# Patient Record
Sex: MALE | Race: WHITE | NOT HISPANIC OR LATINO | Employment: FULL TIME | URBAN - METROPOLITAN AREA
[De-identification: names, ages, dates, MRNs, and addresses within clinical notes are randomized per-mention and may not be internally consistent; named-entity substitution may affect disease eponyms.]

---

## 2017-10-13 ENCOUNTER — AMBULATORY - HEALTHEAST (OUTPATIENT)
Dept: NURSING | Facility: CLINIC | Age: 37
End: 2017-10-13

## 2018-02-27 ENCOUNTER — OFFICE VISIT - HEALTHEAST (OUTPATIENT)
Dept: FAMILY MEDICINE | Facility: CLINIC | Age: 38
End: 2018-02-27

## 2018-02-27 DIAGNOSIS — B35.4 TINEA CORPORIS: ICD-10-CM

## 2018-03-27 ENCOUNTER — OFFICE VISIT - HEALTHEAST (OUTPATIENT)
Dept: FAMILY MEDICINE | Facility: CLINIC | Age: 38
End: 2018-03-27

## 2018-03-27 DIAGNOSIS — Z13.220 LIPID SCREENING: ICD-10-CM

## 2018-03-27 DIAGNOSIS — Z00.00 ROUTINE GENERAL MEDICAL EXAMINATION AT A HEALTH CARE FACILITY: ICD-10-CM

## 2018-03-27 DIAGNOSIS — K21.9 GASTROESOPHAGEAL REFLUX DISEASE, ESOPHAGITIS PRESENCE NOT SPECIFIED: ICD-10-CM

## 2018-03-27 DIAGNOSIS — Z13.1 DIABETES MELLITUS SCREENING: ICD-10-CM

## 2018-03-27 DIAGNOSIS — E66.9 OBESITY: ICD-10-CM

## 2018-03-27 DIAGNOSIS — B35.4 TINEA CORPORIS: ICD-10-CM

## 2018-03-27 LAB
FASTING STATUS PATIENT QL REPORTED: YES
GLUCOSE BLD-MCNC: 78 MG/DL (ref 70–125)

## 2018-03-27 RX ORDER — FAMOTIDINE 20 MG/1
20 TABLET, FILM COATED ORAL 2 TIMES DAILY
Status: SHIPPED | COMMUNITY
Start: 2018-03-27

## 2018-03-27 ASSESSMENT — MIFFLIN-ST. JEOR: SCORE: 2095.59

## 2018-03-28 LAB
CHOLEST SERPL-MCNC: 179 MG/DL
FASTING STATUS PATIENT QL REPORTED: YES
HDLC SERPL-MCNC: 39 MG/DL
LDLC SERPL CALC-MCNC: 116 MG/DL
TRIGL SERPL-MCNC: 118 MG/DL

## 2018-04-02 ENCOUNTER — COMMUNICATION - HEALTHEAST (OUTPATIENT)
Dept: FAMILY MEDICINE | Facility: CLINIC | Age: 38
End: 2018-04-02

## 2018-05-22 ENCOUNTER — COMMUNICATION - HEALTHEAST (OUTPATIENT)
Dept: FAMILY MEDICINE | Facility: CLINIC | Age: 38
End: 2018-05-22

## 2018-05-22 DIAGNOSIS — R21 SKIN RASH: ICD-10-CM

## 2018-06-13 ENCOUNTER — COMMUNICATION - HEALTHEAST (OUTPATIENT)
Dept: FAMILY MEDICINE | Facility: CLINIC | Age: 38
End: 2018-06-13

## 2018-07-16 ENCOUNTER — RECORDS - HEALTHEAST (OUTPATIENT)
Dept: ADMINISTRATIVE | Facility: OTHER | Age: 38
End: 2018-07-16

## 2019-07-12 ENCOUNTER — COMMUNICATION - HEALTHEAST (OUTPATIENT)
Dept: SCHEDULING | Facility: CLINIC | Age: 39
End: 2019-07-12

## 2019-08-03 ENCOUNTER — TELEPHONE (OUTPATIENT)
Dept: OTHER | Facility: CLINIC | Age: 39
End: 2019-08-03

## 2019-08-03 NOTE — TELEPHONE ENCOUNTER
Selection of Care System:  [facility]    Selection of primary provider/clinic:  NONE / [facility]    In general how would you rate your overall health?  Very Good    In general how would you rate your overall mental or emotional health?  Very Good    Have you been to the emergency department 3 or more times in the past 6 months?  No    Do you have any health concerns that you need assistance finding a provider or scheduling an appointment? no    Medications:  NONE    [place] or [facility] assigned to patient: no     Current Pharmacy/location and number: NA     Pharmacy of choice: NA     Prescription names: NA

## 2019-08-10 ENCOUNTER — OFFICE VISIT - HEALTHEAST (OUTPATIENT)
Dept: FAMILY MEDICINE | Facility: CLINIC | Age: 39
End: 2019-08-10

## 2019-08-10 DIAGNOSIS — R42 VERTIGO: ICD-10-CM

## 2019-08-10 ASSESSMENT — MIFFLIN-ST. JEOR: SCORE: 2095.13

## 2019-11-02 ENCOUNTER — AMBULATORY - HEALTHEAST (OUTPATIENT)
Dept: NURSING | Facility: CLINIC | Age: 39
End: 2019-11-02

## 2021-03-27 ENCOUNTER — OFFICE VISIT - HEALTHEAST (OUTPATIENT)
Dept: FAMILY MEDICINE | Facility: CLINIC | Age: 41
End: 2021-03-27

## 2021-03-27 DIAGNOSIS — R05.9 COUGH: ICD-10-CM

## 2021-03-27 DIAGNOSIS — J06.9 VIRAL URI: ICD-10-CM

## 2021-03-27 RX ORDER — ALBUTEROL SULFATE 90 UG/1
2 AEROSOL, METERED RESPIRATORY (INHALATION) EVERY 4 HOURS PRN
Qty: 8.5 G | Refills: 1 | Status: SHIPPED | OUTPATIENT
Start: 2021-03-27 | End: 2021-11-20

## 2021-03-28 LAB
SARS-COV-2 PCR COMMENT: NORMAL
SARS-COV-2 RNA SPEC QL NAA+PROBE: NEGATIVE
SARS-COV-2 VIRUS SPECIMEN SOURCE: NORMAL

## 2021-03-29 ENCOUNTER — COMMUNICATION - HEALTHEAST (OUTPATIENT)
Dept: SCHEDULING | Facility: CLINIC | Age: 41
End: 2021-03-29

## 2021-04-23 ENCOUNTER — COMMUNICATION - HEALTHEAST (OUTPATIENT)
Dept: FAMILY MEDICINE | Facility: CLINIC | Age: 41
End: 2021-04-23

## 2021-04-23 ENCOUNTER — AMBULATORY - HEALTHEAST (OUTPATIENT)
Dept: FAMILY MEDICINE | Facility: CLINIC | Age: 41
End: 2021-04-23

## 2021-04-23 DIAGNOSIS — Z20.822 EXPOSURE TO COVID-19 VIRUS: ICD-10-CM

## 2021-04-24 ENCOUNTER — AMBULATORY - HEALTHEAST (OUTPATIENT)
Dept: FAMILY MEDICINE | Facility: CLINIC | Age: 41
End: 2021-04-24

## 2021-04-24 DIAGNOSIS — Z20.822 EXPOSURE TO COVID-19 VIRUS: ICD-10-CM

## 2021-04-26 ENCOUNTER — COMMUNICATION - HEALTHEAST (OUTPATIENT)
Dept: SCHEDULING | Facility: CLINIC | Age: 41
End: 2021-04-26

## 2021-05-05 ENCOUNTER — COMMUNICATION - HEALTHEAST (OUTPATIENT)
Dept: SCHEDULING | Facility: CLINIC | Age: 41
End: 2021-05-05

## 2021-05-06 ENCOUNTER — AMBULATORY - HEALTHEAST (OUTPATIENT)
Dept: NURSING | Facility: CLINIC | Age: 41
End: 2021-05-06

## 2021-05-06 ENCOUNTER — COMMUNICATION - HEALTHEAST (OUTPATIENT)
Dept: SCHEDULING | Facility: CLINIC | Age: 41
End: 2021-05-06

## 2021-05-06 ENCOUNTER — COMMUNICATION - HEALTHEAST (OUTPATIENT)
Dept: NURSING | Facility: CLINIC | Age: 41
End: 2021-05-06

## 2021-05-06 DIAGNOSIS — U07.1 2019 NOVEL CORONAVIRUS DISEASE (COVID-19): ICD-10-CM

## 2021-05-10 ENCOUNTER — OFFICE VISIT - HEALTHEAST (OUTPATIENT)
Dept: FAMILY MEDICINE | Facility: CLINIC | Age: 41
End: 2021-05-10

## 2021-05-10 ENCOUNTER — RECORDS - HEALTHEAST (OUTPATIENT)
Dept: GENERAL RADIOLOGY | Facility: CLINIC | Age: 41
End: 2021-05-10

## 2021-05-10 ENCOUNTER — COMMUNICATION - HEALTHEAST (OUTPATIENT)
Dept: SCHEDULING | Facility: CLINIC | Age: 41
End: 2021-05-10

## 2021-05-10 DIAGNOSIS — R50.9 FEVER, UNSPECIFIED: ICD-10-CM

## 2021-05-10 DIAGNOSIS — U07.1 COVID-19: ICD-10-CM

## 2021-05-10 DIAGNOSIS — U07.1 CLINICAL DIAGNOSIS OF COVID-19: ICD-10-CM

## 2021-05-10 DIAGNOSIS — R05.9 COUGH: ICD-10-CM

## 2021-05-10 DIAGNOSIS — R93.89 ABNORMAL CHEST X-RAY: ICD-10-CM

## 2021-05-10 DIAGNOSIS — R50.9 FEVER, UNSPECIFIED FEVER CAUSE: ICD-10-CM

## 2021-05-14 ENCOUNTER — COMMUNICATION - HEALTHEAST (OUTPATIENT)
Dept: SCHEDULING | Facility: CLINIC | Age: 41
End: 2021-05-14

## 2021-05-14 ENCOUNTER — OFFICE VISIT - HEALTHEAST (OUTPATIENT)
Dept: FAMILY MEDICINE | Facility: CLINIC | Age: 41
End: 2021-05-14

## 2021-05-14 ENCOUNTER — COMMUNICATION - HEALTHEAST (OUTPATIENT)
Dept: FAMILY MEDICINE | Facility: CLINIC | Age: 41
End: 2021-05-14

## 2021-05-14 DIAGNOSIS — U07.1 COVID-19 VIRUS INFECTION: ICD-10-CM

## 2021-05-14 DIAGNOSIS — H92.02 LEFT EAR PAIN: ICD-10-CM

## 2021-05-14 NOTE — ASSESSMENT & PLAN NOTE
Patient treated for suspected secondary pneumonia starting Monday of this week but overall is improving at this time. Oxygen and pulsenormal per patient report.

## 2021-05-29 ENCOUNTER — RECORDS - HEALTHEAST (OUTPATIENT)
Dept: ADMINISTRATIVE | Facility: CLINIC | Age: 41
End: 2021-05-29

## 2021-05-30 NOTE — TELEPHONE ENCOUNTER
Wife, Andreina, calling, no consent on file.  Patient gave verbal consent to speak with wife.    Wife says he has had temperature of 104.1 today.  Has been nauseous.  Has had 2-3 episodes of diarrhea.  Has body aches and stomach cramps.  Stomach cramps have resolved.  Wife is concerned about the fever.  Patient took Tylenol and temperature was down to 101.  Now temperature is 102.5.     Triaged to disposition of See Physician Within 24 Hours.  Patient intends to go to Walk-in-clinic tomorrow.  Care advice given per protocol: drink more fluids, fever medicines.    Advised to call back if signs of dehydrations occur, blood in stool occurs, abdominal pain becomes severe or constant or he becomes worse.    Lana Mcguire RN  Triage Nurse Advisor    Reason for Disposition    Fever > 101 F (38.3 C)    Protocols used: DIARRHEA-A-AH

## 2021-05-31 ENCOUNTER — RECORDS - HEALTHEAST (OUTPATIENT)
Dept: ADMINISTRATIVE | Facility: CLINIC | Age: 41
End: 2021-05-31

## 2021-05-31 VITALS — WEIGHT: 250 LBS

## 2021-05-31 NOTE — PATIENT INSTRUCTIONS - HE
Advised fluids, close follow up in the Emergency Department if no improvement in 8 hrs.    Pt awaiting ride home. NAD noted. Updated on POC.

## 2021-05-31 NOTE — PROGRESS NOTES
Chief Complaint   Patient presents with     Dizziness     STARTED SINCE YESTERDAY 8/9-- REALLY BAD THIS MORNING.      Ear Problem     FEELS OFF          HPI      Patient is here for one day of dizziness described as room spinning with head movement. No dizziness at rest. No headache, visual disturbances, nausea, vomiting, fever, chills, cough, nasal congestion.     ROS: Pertinent ROS noted in HPI.     No Known Allergies    There is no problem list on file for this patient.      Family History   Problem Relation Age of Onset     No Medical Problems Mother      Kidney cancer Father      Heart failure Maternal Grandmother      Dementia Maternal Grandfather      Cancer Paternal Grandmother        Social History     Socioeconomic History     Marital status:      Spouse name: Not on file     Number of children: Not on file     Years of education: Not on file     Highest education level: Not on file   Occupational History     Not on file   Social Needs     Financial resource strain: Not on file     Food insecurity:     Worry: Not on file     Inability: Not on file     Transportation needs:     Medical: Not on file     Non-medical: Not on file   Tobacco Use     Smoking status: Never Smoker     Smokeless tobacco: Never Used   Substance and Sexual Activity     Alcohol use: No     Drug use: No     Sexual activity: Yes     Partners: Female     Comment:    Lifestyle     Physical activity:     Days per week: Not on file     Minutes per session: Not on file     Stress: Not on file   Relationships     Social connections:     Talks on phone: Not on file     Gets together: Not on file     Attends Gnosticism service: Not on file     Active member of club or organization: Not on file     Attends meetings of clubs or organizations: Not on file     Relationship status: Not on file     Intimate partner violence:     Fear of current or ex partner: Not on file     Emotionally abused: Not on file     Physically abused: Not on  file     Forced sexual activity: Not on file   Other Topics Concern     Not on file   Social History Narrative     Not on file         Objective:    Vitals:    08/10/19 0927   BP: 126/82   Pulse: 72   Resp: 20   Temp: 98.8  F (37.1  C)   SpO2: 98%       Gen:NAD  Throat: oropharynx clear, tonsils normal  Ears: TMs clear without effusions, ear canals normal with small cerumen  Nose: No discharge  Neck: No adenopathy  CV: RRR, normal S1S2, no M, R, G  Pulm: CTAB, normal effort    [place] hallpike = positive  Epley = done with some improvement    Vertigo  -     meclizine (ANTIVERT) 25 mg tablet; Take 1 tablet (25 mg total) by mouth 3 (three) times a day as needed for dizziness or nausea.      Suspect BPPV. Advised fluids, and close follow up if symptoms escalate or fail to improve.

## 2021-06-01 VITALS — WEIGHT: 247.1 LBS | BODY MASS INDEX: 31.71 KG/M2 | HEIGHT: 74 IN

## 2021-06-03 VITALS — WEIGHT: 247 LBS | HEIGHT: 74 IN | BODY MASS INDEX: 31.7 KG/M2

## 2021-06-05 VITALS
SYSTOLIC BLOOD PRESSURE: 132 MMHG | HEART RATE: 81 BPM | RESPIRATION RATE: 18 BRPM | WEIGHT: 253 LBS | OXYGEN SATURATION: 97 % | BODY MASS INDEX: 32.48 KG/M2 | TEMPERATURE: 98.9 F | DIASTOLIC BLOOD PRESSURE: 88 MMHG

## 2021-06-16 PROBLEM — U07.1 COVID-19 VIRUS INFECTION: Status: ACTIVE | Noted: 2021-05-05

## 2021-06-16 NOTE — PROGRESS NOTES
Assessment and Plan:     1. Routine general medical examination at a health care facility  Discussed consuming a healthy diet and exercising.  Discussed importance of routine sunscreen.  Updated Tdap vaccine.   - Tdap vaccine greater than or equal to 6yo IM    2. Diabetes mellitus screening  - Glucose    3. Lipid screening  - Lipid Cascade    4. Obesity  The following high BMI interventions were performed this visit: exercise promotion: strength training, exercise promotion: stretching and nutrition therapy  Discussed his increased risk of diabetes with consumption of soda.      5. Gastroesophageal reflux disease, esophagitis presence not specified  He continues Pepcid.  Educated on foods to avoid.  Recommend weight loss.     6. Tinea corporis  Prescribed ketoconazole twice daily for 2-4 weeks.  Other differentials include eczema, psoriasis.  If no improvement with symptoms, he could consider OTC cortisone.  If no improvement with symptoms, may refer to dermatology.  He is content with the plan.         Subjective:     Adalberto is a 38 y.o. male presenting to the clinic for a male physical.  Patient has been  for 6 years.  He has no concerns for STDs.  He has 3 children.  He has a son who is 3 years old and he has 8-month-old twins (one is a boy and one is an girl).  He does not consume a healthy diet.  He exercises by walking at work.  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  Patient has noticed that his blood pressure has been borderline over the past year.  He does consume a significant amount of Mountain Dew on a daily basis.  Patient has decreased his intake to 4 cans of soda within the last week.  Patient does take Pepcid twice daily for acid reflux which he experiences primarily at bedtime.  Patient was seen at walk-in clinic on 2/27/18 where he was diagnosed with tinea corporis.  He was treated with terbinafine 250 mg once daily for 14 days.  Patient states he has a small area within his right calf  "and left axillary region.  It did mildly improve with the oral medication.  He noticed that it flares after showering.  He did try an over-the-counter antifungal cream for 3-4 weeks.    Review of Systems: A complete 14 point review of systems was obtained and is negative or as stated in the history of present illness.    Social History     Social History     Marital status:      Spouse name: N/A     Number of children: N/A     Years of education: N/A     Occupational History     Not on file.     Social History Main Topics     Smoking status: Never Smoker     Smokeless tobacco: Never Used     Alcohol use Not on file     Drug use: Not on file     Sexual activity: Not on file     Other Topics Concern     Not on file     Social History Narrative       Active Ambulatory Problems     Diagnosis Date Noted     No Active Ambulatory Problems     Resolved Ambulatory Problems     Diagnosis Date Noted     No Resolved Ambulatory Problems     No Additional Past Medical History       No family history on file.    Objective:     /86  Pulse 88  Ht 6' 2\" (1.88 m)  Wt (!) 247 lb 1.6 oz (112.1 kg)  BMI 31.73 kg/m2    General Appearance:    Alert, cooperative, no distress, appears stated age   Head:    Normocephalic, without obvious abnormality, atraumatic   Eyes:    PERRL, conjunctiva/corneas clear, EOM's intact, fundi     benign, both eyes        Ears:    Normal TM's and external ear canals, both ears   Nose:   Nares normal, septum midline, mucosa normal, no drainage    or sinus tenderness   Throat:   Lips, mucosa, and tongue normal; teeth and gums normal   Neck:   Supple, symmetrical, trachea midline, no adenopathy;        thyroid:  No enlargement/tenderness/nodules; no carotid    bruit or JVD   Back:     Symmetric, no curvature, ROM normal, no CVA tenderness   Lungs:     Clear to auscultation bilaterally, respirations unlabored   Chest wall:    No tenderness or deformity   Heart:    Regular rate and rhythm, S1 and S2 " normal, no murmur, rub    or gallop   Abdomen:     Soft, non-tender, bowel sounds active all four quadrants,     no masses, no organomegaly   Genitalia:    Deferred per patient.        Extremities:   Extremities normal, atraumatic, no cyanosis or edema   Pulses:   2+ and symmetric all extremities   Skin:   She has a half dollar size circular dry erythematous rash noted within his right calf.  He has a similar dime size rash within his left axilla.    Lymph nodes:   Cervical, supraclavicular, and axillary nodes normal   Neurologic:   CNII-XII intact. Normal strength, sensation and reflexes       throughout   .

## 2021-06-16 NOTE — PROGRESS NOTES
Assessment:       Tinea corporis      Plan:       PO antifungals per orders  Discussed signs of worsening infection and when to follow-up with PCP if no symptom improvement.     Patient Instructions   You were seen today for ongoing fungal infection known ad ringworm. We will treat with a course of oral antibiotics. Follow-up with your primary care provider if no improvement in 1 week. Return sooner for re-evaluation if you develop a fever of 100.4 or higher, rash changes or continues to spread, new symptoms present such as a cough or headache.     Subjective:        Adalberto Antonio is a 38 y.o. male here for evaluation of a rash. Symptoms have been present for 1 month. The rash is located on the right leg. Since then it has potentially spread to the left axillary region. Patient has tried antifungal cream clotrimazole 1% for initial treatment and the rash has not changed. Discomfort is mild, described as itchy. Patient does not have a fever. Recent illnesses: none. Sick contacts: none known. New recent exposures to food, detergent, soaps: none.     Review of Systems  Pertinent items are noted in HPI    Allergies  No Known Allergies       Objective:       /83  Pulse 75  Temp 98  F (36.7  C) (Oral)   Wt (!) 250 lb (113.4 kg)  SpO2 97%  General appearance: alert, appears stated age, cooperative, no distress and non-toxic  Skin: annular lesion 2cm in size involving the right shin, outer ring of erythema with central dryness/scaling; small 0.5cm erythematous slightly raised lesion at the left armpit; no surrunding erythema or tenderness; no discharge

## 2021-06-16 NOTE — TELEPHONE ENCOUNTER
Reason contacted:  Orders request  Information relayed:    Patients wife tested positive for COVID-19 today and developed symptoms on Monday 4/19/2021.    Patient would like to get tested for COVID-19   He does not have any symptoms currently       Please place order if appropriate.       Additional questions:  No  Further follow-up needed:  Yes  Okay to leave a detailed message:  Yes

## 2021-06-17 NOTE — PROGRESS NOTES
Problem List Items Addressed This Visit     COVID-19 virus infection     Patient treated for suspected secondary pneumonia starting Monday of this week but overall is improving at this time. Oxygen and pulse normal per patient report.           Other Visit Diagnoses     Left ear pain    -  Primary    Relevant Medications    amoxicillin (AMOXIL) 500 MG capsule      I suspect a secondary ear infection. His pneumonia was treated with azithromycin and ear infections can show resistance to this. Amoxicillin for 10 days recommended and prescribed. Patient instructed to follow up in person if symptoms not improving.    Subjective: Adalberto Antonio is a 41 y.o. male who is being evaluated via a billable video visit.  The patient is complaining of posterior ear pain and pressure. He is also complaining of tingling and numbness   He was diagnosed with COVID 4/26/2021. He has pretty much been ill since that time. He did have about 5 days that he was feeling well. He was started on medication for pneumonia on Monday of this week. He reports that he is overall feeling much better. In the last couple of days he has had increased pressure in the left ear. He is also complaining of some numbness and tingling in his posterior neck and arms. No weakness that he has noticed. His oxygen levels have been 93% - 98%, 97 right now. His pulse is 88 now, it was higher previously but is improving as he is feeling better.      Objective: Patient awake and alert in no apparent distress. Intermittent cough during visit but no respiratory distress. No visible neurologic deficits. No visible rash. Moving all extremities normally.    How would you like to obtain your AVS? MyChart.  If dropped from the video visit, the video invitation should be resent by: Text to cell phone: [phone]  Will anyone else be joining your video visit? No      Video Start Time: 4:30        Video-Visit Details    Type of service:  Video Visit    Video End Time (time  video stopped): 4:47 PM  Originating Location (pt. Location): Home    Distant Location (provider location):  [facility]     Platform used for Video Visit: Everardo

## 2021-06-17 NOTE — PROGRESS NOTES
Adalberto Antonio is a 41 y.o. male who is being evaluated via a billable telephone visit.      What phone number would you like to be contacted at? [phone]  How would you like to obtain your AVS? AVS Preference: MyChart.    1. Clinical diagnosis of COVID-19  XR Chest 2 Views   2. Cough  XR Chest 2 Views    azithromycin (ZITHROMAX Z-ORACIO) 250 MG tablet   3. Fever, unspecified fever cause  XR Chest 2 Views   4. Abnormal chest x-ray  azithromycin (ZITHROMAX Z-ORACIO) 250 MG tablet     I ordered and personally reviewed a chest x-ray showing questionable infiltrates within the right lower lung.  Radiology did review of the chest x-ray showing questionable few very subtle opacities over the lung bases which may be sequela of Covid.  No consolidation was present.  Will treat with a azithromycin.  Educated on its indications and side effects.  Patient has 1 more dose of dexamethasone.  I encouraged him to use the albuterol inhaler as needed.  We discussed that he should avoid laying flat on his back.  I encourage deep breathing exercises.  He will continue to monitor his oxygen saturation.  If saturation decreases to the low 90s or upper 80s or he develops shortness of breath with activity, I encourage ER evaluation.  He is content with the plan.    37 minutes spent on the date of the encounter doing chart review, history and exam, documentation and further activities per the note      Subjective   Adalberto Antonio is 41 y.o. and presents today for the following health issues   HPI   Patient has been experiencing cold symptoms for 15 days.  He tested positive for Covid on 4/26/2021.  At the onset of symptoms, he developed fever, body aches, loss of sense of smell and taste.  Patient was seen in the emergency room on 5/5/2021 due to shortness of breath and tachycardia.  Patient states his oxygen saturation dipped into the low 90s.  He was treated with the dexamethasone.  Patient states late last week, his fever returned.   "His temperature was 101.1 this morning.  He continues to experience an occasionally productive cough, body aches, headache.  He denies shortness of breath and wheezing, but states he has had some chest tightness.  He denies stomachache, nausea, vomiting, diarrhea.  Patient is concerned of back pain.  He feels as though the muscles around his spine are cramping.  He has been taking ibuprofen.  His whole family has been diagnosed with Covid.  He has been monitoring his oxygen saturation and it has ranged between 93 and 97%.  He did not receive a Covid vaccine.      Review of Systems  As noted above, otherwise negative.      Objective    Vitals - Patient Reported  Weight (Patient Reported): 245 lb (111.1 kg)  Height (Patient Reported): 6' 2\" (188 cm)  BMI (Based on Pt Reported Ht/Wt): 31.46  SpO2 (Patient Reported): 96  Temperature (Patient Reported): 100  F (37.8  C)  Pulse (Patient Reported): 106  Vitals:  No vitals were obtained today due to virtual visit.    Physical Exam  Patient is alert and is speaking clearly.  He does cough intermittently throughout the visit.  He does not sound short of breath.          Phone call duration: 13 minutes  "

## 2021-06-17 NOTE — TELEPHONE ENCOUNTER
Called patient- see triage encounter for additional information. Video visit at 4 pm today    Irma Guerin RN BA [facility] Triage/Med Refill 5/14/2021 2:31 PM

## 2021-06-17 NOTE — TELEPHONE ENCOUNTER
Patient calling reporting he was COVID 19 positive 4/26/21.   Spouse reporting patient was seen in the Emergency Room last week  for elevated heart rate, cough and given steroids. Reporting no improvement in cough. New onset of fever 5/9/21, current temp 101 Oral, body aches. Pulse oximeter 96 % denies feeling short of breath.    Transferred to Central Scheduling to request Virtual Visit.    Advised patient to call back with any increase or change in symptoms.     Destini Sanchez RN  [facility] Nurse Advisors    COVID 19 Nurse Triage Plan/Patient Instructions    Please be aware that novel coronavirus (COVID-19) may be circulating in the community. If you develop symptoms such as fever, cough, or SOB or if you have concerns about the presence of another infection including coronavirus (COVID-19), please contact your health care provider or visit  [email or web address].    Disposition/Instructions    Virtual Visit with provider recommended. Reference Visit Selection Guide.    Thank you for taking steps to prevent the spread of this virus.  o Limit your contact with others.  o Wear a simple mask to cover your cough.  o Wash your hands well and often.    Resources    [facility]: About COVID-19: [email or web address]/    CDC: What to Do If You're Sick: www.cdc.gov/coronavirus/2019-ncov/about/steps-when-sick.html    CDC: Ending Home Isolation: www.cdc.gov/coronavirus/2019-ncov/hcp/disposition-in-home-patients.html     CDC: Caring for Someone: www.cdc.gov/coronavirus/2019-ncov/if-you-are-sick/care-for-someone.html     [facility]: Interim Guidance for Hospital Discharge to Home: www.health.[facility].[place]/diseases/coronavirus/hcp/hospdischarge.pdf    [facility] clinical trials (COVID-19 research studies): [email or web address]/umn-clinical-trials     Below are the COVID-19 hotlines at the [place] Department of Health ([facility]). Interpreters are available.   o For health questions: Call  529.466.1323 or 1-849.923.8752 (7 a.m. to 7 p.m.)  o For questions about schools and childcare: Call 487-747-4459 or 1-379.894.8809 (7 a.m. to 7 p.m.)      Reason for Disposition    [1] Fever returns after gone for over 24 hours AND [2] symptoms worse or not improved    Additional Information    Negative: SEVERE difficulty breathing (e.g., struggling for each breath, speaks in single words)    Negative: Difficult to awaken or acting confused (e.g., disoriented, slurred speech)    Negative: Bluish (or gray) lips or face now    Negative: Shock suspected (e.g., cold/pale/clammy skin, too weak to stand, low BP, rapid pulse)    Negative: Sounds like a life-threatening emergency to the triager    Negative: SEVERE or constant chest pain or pressure (Exception: mild central chest pain, present only when coughing)    Negative: MODERATE difficulty breathing (e.g., speaks in phrases, SOB even at rest, pulse 100-120)    Negative: [1] Headache AND [2] stiff neck (can't touch chin to chest)    Negative: MILD difficulty breathing (e.g., minimal/no SOB at rest, SOB with walking, pulse <100)    Negative: Chest pain or pressure    Negative: Patient sounds very sick or weak to the triager    Negative: Fever > 103 F (39.4 C)    Negative: [1] Fever > 101 F (38.3 C) AND [2] age > 60 years    Negative: [1] Fever > 100.0 F (37.8 C) AND [2] bedridden (e.g., nursing home patient, CVA, chronic illness, recovering from surgery)    Negative: [1] HIGH RISK patient (e.g., age > 64 years, diabetes, heart or lung disease, weak immune system) AND [2] new or worsening symptoms    Negative: [1] HIGH RISK patient AND [2] influenza is widespread in the community AND [3] ONE OR MORE respiratory symptoms: cough, sore throat, runny or stuffy nose    Negative: [1] HIGH RISK patient AND [2] influenza exposure within the last 7 days AND [3] ONE OR MORE respiratory symptoms: cough, sore throat, runny or stuffy nose    Negative: Fever present > 3 days (72  hours)    Protocols used: CORONAVIRUS (COVID-19) DIAGNOSED OR [id number] 3.25.21

## 2021-06-17 NOTE — PROGRESS NOTES
Clinic Care Coordination Contact    Clinic Care Coordination Contact     Follow Up Progress Note      Reason for Referral:      Intervention/Education provided during outreach: CHW spoke with patient. Reviewed Hospital COVID discharge instructions. Patient DID  accept assistance from CHW to schedule PCP follow up appointment with PCP at [facility].      Plan: CHW assisted patient with calling backdoor scheduling to schedule follow up appointment with [facility] PCP @ [facility] on 5/11/2021 at 5:10pm.     No further Outreach will be done at this time.

## 2021-06-17 NOTE — TELEPHONE ENCOUNTER
"SX began last Tuesday 4/26: COVID +.  He states he has had \"a whole bunch of symptoms\": currently cough, shortness of breath at rest and with activity, loss of taste and smell. O2 sat 94-99 % today. He wants to know if there is anything he can take for the cough? He says the cough causes a headache. He has an albuterol inhaler which seems to help \"sometimes\"--this was given for bronchitis 1+1/2 mo ago.\"It seems to help, but for only about 20-30 min\". Problem with worsening cough for the last 2 days, shortness of breath began today.  He states his whole family got COVID--he was the last to get it.   PCP: none. He has used [facility]  or [facility].    Triaged to a disposition of Go to ED now, which he intends to do.    Libby Souza RN Triage Nurse Advisor 9:41 PM 5/5/2021      Reason for Disposition    MILD difficulty breathing (e.g., minimal/no SOB at rest, SOB with walking, pulse <100)    Additional Information    Negative: SEVERE difficulty breathing (e.g., struggling for each breath, speaks in single words)    Negative: Difficult to awaken or acting confused (e.g., disoriented, slurred speech)    Negative: Bluish (or gray) lips or face now    Negative: Shock suspected (e.g., cold/pale/clammy skin, too weak to stand, low BP, rapid pulse)    Negative: Sounds like a life-threatening emergency to the triager    Negative: [1] COVID-19 exposure AND [2] has not completed COVID-19 vaccine series AND [3] no symptoms    Negative: [1] COVID-19 exposure AND [2] completed COVID-19 vaccine series (fully vaccinated) AND [3] no symptoms    Negative: COVID-19 vaccine reaction suspected (e.g., fever, headache, muscle aches) occurring during days 1-3 after getting vaccine    Negative: COVID-19 vaccine, questions about    Negative: [1] COVID-19 vaccine series completed (fully vaccinated) in past 3 months AND [2] new-onset of COVID-19 symptoms BUT [3] no known exposure    Negative: [1] Had lab test confirmed COVID-19 infection " within last 3 monthsAND [2] new-onset of COVID-19 symptoms BUT [3] no known exposure    Negative: [1] Lives with someone known to have influenza (flu test positive) AND [2] flu-like symptoms (e.g., cough, runny nose, sore throat, SOB; with or without fever)    Negative: [1] Adult with possible COVID-19 symptoms AND [2] triager concerned about severity of symptoms or other causes    Negative: COVID-19 and breastfeeding, questions about    Negative: SEVERE or constant chest pain or pressure (Exception: mild central chest pain, present only when coughing)    Negative: MODERATE difficulty breathing (e.g., speaks in phrases, SOB even at rest, pulse 100-120)    Negative: [1] Headache AND [2] stiff neck (can't touch chin to chest)    Protocols used: CORONAVIRUS (COVID-19) DIAGNOSED OR CSHUTFMLY-O-WT 3.25.21  COVID 19 Nurse Triage Plan/Patient Instructions    Please be aware that novel coronavirus (COVID-19) may be circulating in the community. If you develop symptoms such as fever, cough, or SOB or if you have concerns about the presence of another infection including coronavirus (COVID-19), please contact your health care provider or visit  [email or web address].    Disposition/Instructions    ED Visit recommended. Follow protocol based instructions.      Bring Your Own Device:  Please also bring your smart device(s) (smart phones, tablets, laptops) and their charging cables for your personal use and to communicate with your care team during your visit.      Thank you for taking steps to prevent the spread of this virus.  o Limit your contact with others.  o Wear a simple mask to cover your cough.  o Wash your hands well and often.    Resources    [facility]: About COVID-19: [email or web address]    CDC: What to Do If You're Sick: www.cdc.gov/coronavirus/2019-ncov/about/steps-when-sick.html    CDC: Ending Home Isolation: www.cdc.gov/coronavirus/2019-ncov/hcp/disposition-in-home-patients.html      CDC: Caring for Someone: www.cdc.gov/coronavirus/2019-ncov/if-you-are-sick/care-for-someone.html     [facility]: Interim Guidance for Hospital Discharge to Home: www.health.[facility].[place].us/diseases/coronavirus/hcp/hospdischarge.pdf    [facility] clinical trials (COVID-19 research studies): clinicalaffairs.[facility]-clinical-trials     Below are the COVID-19 hotlines at the [place] Department of Health ([facility]). Interpreters are available.   o For health questions: Call 715-254-0622 or [phone] (7 a.m. to 7 p.m.)  For questions about schools and childcare: Call 739-253-0475 or [phone] (7 a.m. to 7 p.m.)

## 2021-06-17 NOTE — TELEPHONE ENCOUNTER
Patient calling with questions regarding getting the COVID vaccine following an exposure in his family. Reports he gets tested every few days as his children and wife all tested positive over the weekend and wants to catch it if he does test positive. Patient is currently asymptomatic. Reviewed quarantine period due to exposure and quarantine period if a test comes back positive with and without symptoms. Virtaul visit recommended with patient declining this, will continue to test every few days this week. Patient expressed understanding with care advice and will reschedule COVID vaccine.     Shanthi Mancini RN 04/26/21 11:19 AM  [facility] Triage Nurse Advisor    Reason for Disposition    [1] CLOSE CONTACT COVID-19 EXPOSURE within last 14 days AND [2] NO symptoms    Additional Information    Negative: COVID-19 lab test positive    Negative: [1] Lives with someone known to have influenza (flu test positive) AND [2] flu-like symptoms (e.g., cough, runny nose, sore throat, SOB; with or without fever)    Negative: [1] Symptoms of COVID-19 (e.g., cough, fever, SOB, or others) AND [2] HCP diagnosed COVID-19 based on symptoms    Negative: [1] Symptoms of COVID-19 (e.g., cough, fever, SOB, or others) AND [2] lives in an area with community spread    Negative: [1] Symptoms of COVID-19 (e.g., cough, fever, SOB, or others) AND [2] within 14 days of EXPOSURE (close contact) with diagnosed or suspected COVID-19 patient    Negative: [1] Symptoms of COVID-19 (e.g., cough, fever, SOB, or others) AND [2] within 14 days of travel from high-risk area for COVID-19 community spread (identified by CDC)    Negative: [1] Difficulty breathing (shortness of breath) occurs AND [2] onset > 14 days after COVID-19 EXPOSURE (Close Contact)    Negative: [1] Dry cough occurs AND [2] onset > 14 days after COVID-19 EXPOSURE    Negative: [1] Wet cough (i.e., white-yellow, yellow, green, or ariana colored sputum) AND [2] onset > 14 days after COVID-19  EXPOSURE    Negative: [1] Common cold symptoms AND [2] onset > 14 days after COVID-19 EXPOSURE    Negative: COVID-19 vaccine reaction suspected (e.g., fever, headache, muscle aches) occurring during days 1-3 after getting vaccine    Negative: COVID-19 vaccine, questions about    Negative: [1] CLOSE CONTACT COVID-19 EXPOSURE within last 14 days AND [2] needs COVID-19 lab test to return to work AND [3] NO symptoms    Negative: [1] CLOSE CONTACT COVID-19 EXPOSURE within last 14 days AND [2] exposed person is a  (e.g., police or paramedic) AND [3] NO symptoms    Negative: [1] CLOSE CONTACT COVID-19 EXPOSURE within last 14 days AND [2] exposed person is a healthcare worker who was NOT using all recommended personal protective equipment (i.e., a respirator-N95 mask, eye protection, gloves, and gown) AND [3] NO symptoms    Negative: [1] Living or working in a correctional facility, long-term care facility, or shelter (i.e., congregate setting; densely populated) AND [2] where an outbreak has occurred AND [3] NO symptoms    Protocols used: CORONAVIRUS (COVID-19) EXPOSURE-A-AH 1.3.21    COVID 19 Nurse Triage Plan/Patient Instructions    Please be aware that novel coronavirus (COVID-19) may be circulating in the community. If you develop symptoms such as fever, cough, or SOB or if you have concerns about the presence of another infection including coronavirus (COVID-19), please contact your health care provider or visit  [email or web address].    Disposition/Instructions    Virtual Visit with provider recommended. Reference Visit Selection Guide. and In-Person Visit with provider recommended. Reference Visit Selection Guide.    Thank you for taking steps to prevent the spread of this virus.  o Limit your contact with others.  o Wear a simple mask to cover your cough.  o Wash your hands well and often.    Resources    [facility]: About COVID-19: [email or web address]    CDC: What to Do  If You're Sick: www.cdc.gov/coronavirus/2019-ncov/about/steps-when-sick.html    CDC: Ending Home Isolation: www.cdc.gov/coronavirus/2019-ncov/hcp/disposition-in-home-patients.html     CDC: Caring for Someone: www.cdc.gov/coronavirus/2019-ncov/if-you-are-sick/care-for-someone.html     [facility]: Interim Guidance for Hospital Discharge to Home: www.[facility].mn./diseases/coronavirus/hcp/hospdischarge.pdf    [facility] clinical trials (COVID-19 research studies): clinicalaffairs.[facility]-clinical-trials     Below are the COVID-19 hotlines at the [place] Department of Health ([facility]). Interpreters are available.   o For health questions: Call 424-213-8607 or [phone] (7 a.m. to 7 p.m.)  o For questions about schools and childcare: Call 243-866-5000 or 1-251.912.2092 (7 a.m. to 7 p.m.)

## 2021-06-17 NOTE — TELEPHONE ENCOUNTER
Triage call:     Pressure in his ears has gotten worse over the last week  Antibiotic for his breathing   Was in on Monday for a chest xray- COVID + 5/5/21   - breathing has gotten better, since his chest xray and the antibiotic    Both arms are affected   States it feels like his blood flow isn't flowing well  Biceps and into shoulders, into his neck  Some back pain as well during COVID  States he also feels foggy- a lag in his mentation   Neck is not painful  Denies additional neuro symptoms    Advised a video visit today - patient agreeable to doing so. Reviewed additional care advice with patient. He verbalizes understanding. Assisted in transferring to scheduling.     Irma Guerin RN BSBA [facility] Triage/Med Refill 5/14/2021 2:29 PM    COVID 19 Nurse Triage Plan/Patient Instructions    Please be aware that novel coronavirus (COVID-19) may be circulating in the community. If you develop symptoms such as fever, cough, or SOB or if you have concerns about the presence of another infection including coronavirus (COVID-19), please contact your health care provider or visit  [email or web address].    Disposition/Instructions    Virtual Visit with provider recommended. Reference Visit Selection Guide.    Thank you for taking steps to prevent the spread of this virus.  o Limit your contact with others.  o Wear a simple mask to cover your cough.  o Wash your hands well and often.    Resources    [facility]: About COVID-19: [email or web address]    CDC: What to Do If You're Sick: www.cdc.gov/coronavirus/2019-ncov/about/steps-when-sick.html    CDC: Ending Home Isolation: www.cdc.gov/coronavirus/2019-ncov/hcp/disposition-in-home-patients.html     CDC: Caring for Someone: www.cdc.gov/coronavirus/2019-ncov/if-you-are-sick/care-for-someone.html     [facility]: Interim Guidance for Hospital Discharge to Home: www.health.[facility].mn.us/diseases/coronavirus/hcp/hospdischarge.pdf    [facility]  [place] clinical trials (COVID-19 research studies): clinicalaffairs.[facility]-clinical-trials     Below are the COVID-19 hotlines at the [place] Department of Health ([facility]). Interpreters are available.   o For health questions: Call 424-900-3374 or 1-797.359.7437 (7 a.m. to 7 p.m.)  o For questions about schools and childcare: Call 898-118-9647 or 1-673.715.7330 (7 a.m. to 7 p.m.)       Reason for Disposition    Back pain (with neurologic deficit)    Additional Information    Negative: Difficult to awaken or acting confused (e.g., disoriented, slurred speech)    Negative: New neurologic deficit that is present NOW, sudden onset of ANY of the following: * Weakness of the face, arm, or leg on one side of the body* Numbness of the face, arm, or leg on one side of the body* Loss of speech or garbled speech    Negative: Sounds like a life-threatening emergency to the triager    Negative: Confusion, disorientation, or hallucinations is the main symptom    Negative: Dizziness is the main symptom    Negative: Followed a head injury within last 3 days    Negative: Headache (with neurologic deficit)    Negative: Unable to urinate (or only a few drops) and bladder feels very full    Negative: Loss of control of bowel or bladder (i.e., incontinence) of new onset    Negative: Back pain with numbness (loss of sensation) in groin or rectal area    Negative: Patient sounds very sick or weak to the triager    Negative: Neurologic deficit that was brief (now gone), ANY of the following: * Weakness of the face, arm, or leg on one side of the body * Numbness of the face, arm, or leg on one side of the body * Loss of speech or garbled speech    Negative: Neurologic deficit of gradual onset, ANY of the following: * Weakness of the face, arm, or leg on one side of the body * Numbness of the face, arm, or leg on one side of the body * Loss of speech or garbled speech    Negative: [place] palsy suspected (i.e., weakness only one side of  the face, developing over hours to days, no other symptoms)    Negative: Tingling (e.g., pins and needles) of the face, arm or leg on one side of the body, that is  present now (Exceptions: chronic/recurrent symptom lasting > 4 weeks or tingling from known cause, such as: bumped elbow, carpal tunnel syndrome, pinched nerve, frostbite)    Negative: Neck pain (with neurologic deficit)    Protocols used: NEUROLOGIC DEFICIT-A-[place]

## 2021-06-17 NOTE — TELEPHONE ENCOUNTER
Telephone Encounter by Irma Guerin RN at 5/14/2021  2:13 PM     Author: Irma Guerin RN Service: -- Author Type: Registered Nurse    Filed: 5/14/2021  2:30 PM Encounter Date: 5/14/2021 Status: Signed    : Irma Guerin RN (Registered Nurse)       Cher Richmond MA routed conversation to [facility] Nurse Triage Pool 6 minutes ago (2:06 PM)      Tanisha Goyal CNP Pierce, Jana Care Team Pool 7 minutes ago (2:04 PM)     Can we have a nurse triage him to see if he needs to be evaluated in the ER?  Thanks!     Message text       Shanthi Flores CMA routed conversation to Tanisha Goyal CNP 2 hours ago (11:33 AM)      Adalberto Antonio Jana L, CNP 2 hours ago (11:31 AM)        Hi, I just wanted to let you know I have a couple things that still seem strange. I have some pressure in my left ear that has been there since Tuesday. Seems to be worse today and is kind of leaving me foggy. I thought that the antibiotic would clear up an ear infection so I didn't think much of it before. I also seem to have tingling/slight loss of sensation in my arms and shoulders. I'm not sure how to better describe it but it's very unusual and not something I've experienced before. Please let me know if there is anything you need me to do.

## 2021-06-18 NOTE — PATIENT INSTRUCTIONS - HE
Patient Instructions by Destini Hu MD at 3/27/2021  9:20 AM     Author: Destini Hu MD Service: -- Author Type: Physician    Filed: 3/27/2021  9:39 AM Encounter Date: 3/27/2021 Status: Addendum    : Destini Hu MD (Physician)    Related Notes: Original Note by Destini Hu MD (Physician) filed at 3/27/2021  9:38 AM         Patient Education     Viral Upper Respiratory Illness (Adult)  You have a viral upper respiratory illness (URI), which is another term for the common cold. This illness is contagious during the first few days. It is spread through the air by coughing and sneezing. It may also be spread by direct contact (touching the sick person and then touching your own eyes, nose, or mouth). Frequent handwashing will decrease risk of spread. Most viral illnesses go away within 7 to 10 days with rest and simple home remedies. Sometimes the illness may last for several weeks. Antibiotics will not kill a virus, and they are generally not prescribed for this condition.    Home care    If symptoms are severe, rest at home for the first 2 to 3 days. When you resume activity, don't let yourself get too tired.    Avoid being exposed to cigarette smoke (yours or others).    You may use acetaminophen or ibuprofen to control pain and fever, unless another medicine was prescribed. If you have chronic liver or kidney disease, have ever had a stomach ulcer or gastrointestinal bleeding, or are taking blood-thinning medicines, talk with your healthcare provider before using these medicines. Aspirin should never be given to anyone under 18 years of age who is ill with a viral infection or fever. It may cause severe liver or brain damage.    Your appetite may be poor, so a light diet is fine. Avoid dehydration by drinking 6 to 8 glasses of fluids per day (water, soft drinks, juices, tea, or soup). Extra fluids will help loosen secretions in the nose and lungs.    Over-the-counter  cold medicines will not shorten the length of time youre sick, but they may be helpful for the following symptoms: cough, sore throat, and nasal and sinus congestion. (Note: Do not use decongestants if you have high blood pressure.)  Follow-up care  Follow up with your healthcare provider, or as advised.  When to seek medical advice  Call your healthcare provider right away if any of these occur:    Cough with lots of colored sputum (mucus)    Severe headache; face, neck, or ear pain    Difficulty swallowing due to throat pain    Fever of 100.4 F (38 C) or higher, or as directed by your healthcare provider  Call 911  Call 911 if any of these occur:    Chest pain, shortness of breath, wheezing, or difficulty breathing    Coughing up blood    Inability to swallow due to throat pain  Date Last Reviewed: 9/13/2015 2000-2017 [employer]. [address] [place] [address]. All rights reserved. This information is not intended as a substitute for professional medical care. Always follow your healthcare professional's instructions.         Discharge Instructions for COVID-19 Patients  You have--or may have--COVID-19. Please follow the instructions listed below.   If you have a weakened immune system, discuss with your doctor any other actions you need to take.  How can I protect others?  If you have symptoms (fever, cough, body aches or trouble breathing):    Stay home and away from others (self-isolate) until:  ? Your other symptoms have resolved (gotten better). And?  ? You've had no fever--and no medicine that reduces fever--for 1 full day (24 hours). And?  ? At least 10 days have passed since your symptoms started. (You may need to wait 20 days. Follow the advice of your care team.)  If you don't show symptoms, but testing showed that you have COVID-19:    Stay home and away from others (self-isolate) until at least 10 days have passed since the date of your first positive COVID-19 test.  During  "this time    Stay in your own room, even for meals. Use your own bathroom if you can.    Stay away from others in your home. No hugging, kissing or shaking hands. No visitors.    Don't go to work, school or anywhere else.    Clean \"high touch\" surfaces often (doorknobs, counters, handles). Use household cleaning spray or wipes.    You'll find a full list of  on the EPA website: www.epa.gov/pesticide-registration/list-n-disinfectants-use-against-sars-cov-2.    Cover your mouth and nose with a mask or other face covering to avoid spreading germs.    Wash your hands and face often. Use soap and water.    Caregivers in these groups are at risk for severe illness due to COVID-19:  ? People 65 years and older  ? People who live in a nursing home or long-term care facility  ? People with chronic disease (lung, heart, cancer, diabetes, kidney, liver, immunologic)  ? People who have a weakened immune system, including those who:    Are in cancer treatment    Take medicine that weakens the immune system, such as corticosteroids    Had a bone marrow or organ transplant    Have an immune deficiency    Have poorly controlled HIV or AIDS    Are obese (body mass index of 40 or higher)    Smoke regularly    Caregivers should wear gloves while washing dishes, handling laundry and cleaning bedrooms and bathrooms.    Use caution when washing and drying laundry: Don't shake dirty laundry and use the warmest water setting that you can.    For more tips on managing your health at home, go to www.cdc.gov/coronavirus/2019-ncov/downloads/10Things.pdf.  How can I take care of myself at home?  1. Get lots of rest. Drink extra fluids (unless a doctor has told you not to).  2. Take Tylenol (acetaminophen) for fever or pain. If you have liver or kidney problems, ask your family doctor if it's okay to take Tylenol.   Adults can take either:   ? 650 mg (two 325 mg pills) every 4 to 6 hours, or?  ? 1,000 mg (two 500 mg pills) every 8 hours " as needed.  ? Note: Don't take more than 3,000 mg in one day. Acetaminophen is found in many medicines (both prescribed and over-the-counter medicines). Read all labels to be sure you don't take too much.   For children, check the Tylenol bottle for the right dose. The dose is based on the child's age or weight.  3. If you have other health problems (like cancer, heart failure, an organ transplant or severe kidney disease): Call your specialty clinic if you don't feel better in the next 2 days.  4. Know when to call 911. Emergency warning signs include:  ? Trouble breathing or shortness of breath  ? Pain or pressure in the chest that doesn't go away  ? Feeling confused like you haven't felt before, or not being able to wake up  ? Bluish-colored lips or face  5. Your doctor may have prescribed a blood thinner medicine. Follow their instructions.  Where can I get more information?    [facility] - About COVID-19:   [email or web address]    CDC - What to Do If You're Sick: www.cdc.gov/coronavirus/2019-ncov/about/steps-when-sick.html    CDC - Ending Home Isolation: www.cdc.gov/coronavirus/2019-ncov/hcp/disposition-in-home-patients.html    CDC - Caring for Someone: www.cdc.gov/coronavirus/2019-ncov/if-you-are-sick/care-for-someone.html    [facility] - Interim Guidance for Hospital Discharge to Home: www.health.[facility].mn.us/diseases/coronavirus/hcp/hospdischarge.pdf    Below are the COVID-19 hotlines at the [place] Department of Health ([facility]). Interpreters are available.  ? For health questions: Call 105-171-4438 or 1-372.813.4945 (7 a.m. to 7 p.m.)  ? For questions about schools and childcare: Call 947-379-2295 or 1-112.372.9630 (7 a.m. to 7 p.m.)    For informational purposes only. Not to replace the advice of your health care provider. Clinically reviewed by Dr. Rajendra Cooper.   Copyright   2020 [employer]. All rights reserved. [employer] 248201 - REV 01/05/21.

## 2021-06-20 ENCOUNTER — HEALTH MAINTENANCE LETTER (OUTPATIENT)
Age: 41
End: 2021-06-20

## 2021-06-30 NOTE — PROGRESS NOTES
Progress Notes by Destini Hu MD at 3/27/2021  9:20 AM     Author: Destini Hu MD Service: -- Author Type: Physician    Filed: 3/27/2021 12:58 PM Encounter Date: 3/27/2021 Status: Signed    : Destini Hu MD (Physician)       Assessment:     1. Viral URI  benzonatate (TESSALON) 200 MG capsule   2. Cough  Symptomatic COVID-19 Virus (CORONAVIRUS) PCR    albuterol (PROAIR HFA;PROVENTIL HFA;VENTOLIN HFA) 90 mcg/actuation inhaler          Plan:     Symptoms consistent with a probable viral upper respiratory infection.  Discussed the typical course of symptoms.  No antibiotics indicated at this time.  Patient tested for COVID-19 and should remain isolated/quarantined until he gets the results of his test back.  I did give him a prescription for Tessalon Perles as well as an albuterol inhaler to use for his symptoms.  Recommend symptomatic treatment such as decongestants and acetominephen or ibuprofen as needed.  Recommend follow up if getting worse or not improving.      149    Patient Instructions     Patient Education     Viral Upper Respiratory Illness (Adult)  You have a viral upper respiratory illness (URI), which is another term for the common cold. This illness is contagious during the first few days. It is spread through the air by coughing and sneezing. It may also be spread by direct contact (touching the sick person and then touching your own eyes, nose, or mouth). Frequent handwashing will decrease risk of spread. Most viral illnesses go away within 7 to 10 days with rest and simple home remedies. Sometimes the illness may last for several weeks. Antibiotics will not kill a virus, and they are generally not prescribed for this condition.    Home care    If symptoms are severe, rest at home for the first 2 to 3 days. When you resume activity, don't let yourself get too tired.    Avoid being exposed to cigarette smoke (yours or others).    You may use acetaminophen or ibuprofen  to control pain and fever, unless another medicine was prescribed. If you have chronic liver or kidney disease, have ever had a stomach ulcer or gastrointestinal bleeding, or are taking blood-thinning medicines, talk with your healthcare provider before using these medicines. Aspirin should never be given to anyone under 18 years of age who is ill with a viral infection or fever. It may cause severe liver or brain damage.    Your appetite may be poor, so a light diet is fine. Avoid dehydration by drinking 6 to 8 glasses of fluids per day (water, soft drinks, juices, tea, or soup). Extra fluids will help loosen secretions in the nose and lungs.    Over-the-counter cold medicines will not shorten the length of time youre sick, but they may be helpful for the following symptoms: cough, sore throat, and nasal and sinus congestion. (Note: Do not use decongestants if you have high blood pressure.)  Follow-up care  Follow up with your healthcare provider, or as advised.  When to seek medical advice  Call your healthcare provider right away if any of these occur:    Cough with lots of colored sputum (mucus)    Severe headache; face, neck, or ear pain    Difficulty swallowing due to throat pain    Fever of 100.4 F (38 C) or higher, or as directed by your healthcare provider  Call 911  Call 911 if any of these occur:    Chest pain, shortness of breath, wheezing, or difficulty breathing    Coughing up blood    Inability to swallow due to throat pain  Date Last Reviewed: 9/13/2015 2000-2017 [employer]. [address] [place] [address]. All rights reserved. This information is not intended as a substitute for professional medical care. Always follow your healthcare professional's instructions.         Discharge Instructions for COVID-19 Patients  You have--or may have--COVID-19. Please follow the instructions listed below.   If you have a weakened immune system, discuss with your doctor any other  "actions you need to take.  How can I protect others?  If you have symptoms (fever, cough, body aches or trouble breathing):    Stay home and away from others (self-isolate) until:  ? Your other symptoms have resolved (gotten better). And?  ? You've had no fever--and no medicine that reduces fever--for 1 full day (24 hours). And?  ? At least 10 days have passed since your symptoms started. (You may need to wait 20 days. Follow the advice of your care team.)  If you don't show symptoms, but testing showed that you have COVID-19:    Stay home and away from others (self-isolate) until at least 10 days have passed since the date of your first positive COVID-19 test.  During this time    Stay in your own room, even for meals. Use your own bathroom if you can.    Stay away from others in your home. No hugging, kissing or shaking hands. No visitors.    Don't go to work, school or anywhere else.    Clean \"high touch\" surfaces often (doorknobs, counters, handles). Use household cleaning spray or wipes.    You'll find a full list of  on the EPA website: www.epa.gov/pesticide-registration/list-n-disinfectants-use-against-sars-cov-2.    Cover your mouth and nose with a mask or other face covering to avoid spreading germs.    Wash your hands and face often. Use soap and water.    Caregivers in these groups are at risk for severe illness due to COVID-19:  ? People 65 years and older  ? People who live in a nursing home or long-term care facility  ? People with chronic disease (lung, heart, cancer, diabetes, kidney, liver, immunologic)  ? People who have a weakened immune system, including those who:    Are in cancer treatment    Take medicine that weakens the immune system, such as corticosteroids    Had a bone marrow or organ transplant    Have an immune deficiency    Have poorly controlled HIV or AIDS    Are obese (body mass index of 40 or higher)    Smoke regularly    Caregivers should wear gloves while washing dishes, " handling laundry and cleaning bedrooms and bathrooms.    Use caution when washing and drying laundry: Don't shake dirty laundry and use the warmest water setting that you can.    For more tips on managing your health at home, go to www.cdc.gov/coronavirus/2019-ncov/downloads/10Things.pdf.  How can I take care of myself at home?  1. Get lots of rest. Drink extra fluids (unless a doctor has told you not to).  2. Take Tylenol (acetaminophen) for fever or pain. If you have liver or kidney problems, ask your family doctor if it's okay to take Tylenol.   Adults can take either:   ? 650 mg (two 325 mg pills) every 4 to 6 hours, or?  ? 1,000 mg (two 500 mg pills) every 8 hours as needed.  ? Note: Don't take more than 3,000 mg in one day. Acetaminophen is found in many medicines (both prescribed and over-the-counter medicines). Read all labels to be sure you don't take too much.   For children, check the Tylenol bottle for the right dose. The dose is based on the child's age or weight.  3. If you have other health problems (like cancer, heart failure, an organ transplant or severe kidney disease): Call your specialty clinic if you don't feel better in the next 2 days.  4. Know when to call 911. Emergency warning signs include:  ? Trouble breathing or shortness of breath  ? Pain or pressure in the chest that doesn't go away  ? Feeling confused like you haven't felt before, or not being able to wake up  ? Bluish-colored lips or face  5. Your doctor may have prescribed a blood thinner medicine. Follow their instructions.  Where can I get more information?     [facility] - About COVID-19:   [email or web address]    CDC - What to Do If You're Sick: www.cdc.gov/coronavirus/2019-ncov/about/steps-when-sick.html    CDC - Ending Home Isolation: www.cdc.gov/coronavirus/2019-ncov/hcp/disposition-in-home-patients.html    CDC - Caring for Someone:  www.cdc.gov/coronavirus/2019-ncov/if-you-are-sick/care-for-someone.html    [facility] - Interim Guidance for Hospital Discharge to Home: www.health.[facility].[place].us/diseases/coronavirus/hcp/hospdischarge.pdf    Below are the COVID-19 hotlines at the [place] Department of Health ([facility]). Interpreters are available.  ? For health questions: Call 289-227-2225 or 1-246.887.5861 (7 a.m. to 7 p.m.)  ? For questions about schools and childcare: Call 115-520-3019 or 1-123.638.2611 (7 a.m. to 7 p.m.)    For informational purposes only. Not to replace the advice of your health care provider. Clinically reviewed by Dr. Rajendra Cooper.   Copyright   2020 [employer]. All rights reserved. [employer] 812932 - REV 01/05/21.        Subjective:       41 y.o. male presents for evaluation of 3-day history of nasal congestion, sore throat, and cough, and bilateral ear fullness..  He feels like his chest is somewhat tight when he coughs.  He denies shortness of breath except with a lot of of exertion.  No known exposure to COVID-19.  He was exposed his 5-year-old son who came home with similar symptoms from school.  The son was tested for COVID-19 and was negative.  Patient has tried some Sudafed for symptoms which helped somewhat.  He denies any headache, abdominal discomfort, or loss of sense of smell or taste.    There is no problem list on file for this patient.      Past Medical History:   Diagnosis Date   ? Nephrolithiasis        Past Surgical History:   Procedure Laterality Date   ? LITHOTRIPSY         Current Outpatient Medications on File Prior to Visit   Medication Sig Dispense Refill   ? famotidine (PEPCID) 20 MG tablet Take 20 mg by mouth 2 (two) times a day.     ? ibuprofen (ADVIL,MOTRIN) 200 MG tablet Take 800 mg by mouth every 8 (eight) hours as needed for pain.     ? [DISCONTINUED] ketoconazole (NIZORAL) 2 % cream Apply to the affected twice daily 30 g 3   ? [DISCONTINUED] meclizine (ANTIVERT) 25 mg tablet Take 1 tablet  (25 mg total) by mouth 3 (three) times a day as needed for dizziness or nausea. 30 tablet 0     No current facility-administered medications on file prior to visit.        No Known Allergies      Review of Systems  A 12 point comprehensive review of systems was negative except as noted.      Objective:                                    General Appearance:      Vitals:    03/27/21 0918   BP: 132/88   Pulse: 81   Resp: 18   Temp: 98.9  F (37.2  C)   SpO2: 97%           Alert, pleasant, cooperative, no distress, appears stated age   Head:    Normocephalic, without obvious abnormality, atraumatic   Eyes:    Conjunctiva/corneas clear   Ears:    Normal TM's without erythema or bulging. Normal external ear canals, both ears   Nose:   Nares normal, septum midline, mucosa normal, no drainage    or sinus tenderness   Throat:   Lips, mucosa, and tongue normal; teeth and gums normal.  No tonsilar hypertrophy or exudate.   Neck:   Supple, symmetrical, trachea midline, no adenopathy    Lungs:     Clear to auscultation bilaterally without wheezes, rales, or rhonchi, respirations unlabored    Heart:    Regular rate and rhythm, S1 and S2 normal, no murmur, rub or gallop       Extremities:   Extremities normal, atraumatic, no cyanosis or edema   Skin:   Skin color, texture, turgor normal, no rashes or lesions          This note has been dictated using voice recognition software. Any grammatical or context distortions are unintentional and inherent to the software

## 2021-10-07 ENCOUNTER — TRANSFERRED RECORDS (OUTPATIENT)
Dept: HEALTH INFORMATION MANAGEMENT | Facility: CLINIC | Age: 41
End: 2021-10-07

## 2021-10-10 ENCOUNTER — HEALTH MAINTENANCE LETTER (OUTPATIENT)
Age: 41
End: 2021-10-10

## 2021-10-10 ENCOUNTER — OFFICE VISIT (OUTPATIENT)
Dept: FAMILY MEDICINE | Facility: CLINIC | Age: 41
End: 2021-10-10
Payer: COMMERCIAL

## 2021-10-10 VITALS
BODY MASS INDEX: 33.08 KG/M2 | OXYGEN SATURATION: 98 % | TEMPERATURE: 98.5 F | DIASTOLIC BLOOD PRESSURE: 87 MMHG | HEART RATE: 72 BPM | SYSTOLIC BLOOD PRESSURE: 125 MMHG | RESPIRATION RATE: 14 BRPM | WEIGHT: 249 LBS

## 2021-10-10 DIAGNOSIS — R09.81 NASAL CONGESTION: Primary | ICD-10-CM

## 2021-10-10 DIAGNOSIS — R07.0 THROAT PAIN: ICD-10-CM

## 2021-10-10 LAB
DEPRECATED S PYO AG THROAT QL EIA: NEGATIVE
GROUP A STREP BY PCR: NOT DETECTED
SARS-COV-2 RNA RESP QL NAA+PROBE: NEGATIVE

## 2021-10-10 PROCEDURE — 99213 OFFICE O/P EST LOW 20 MIN: CPT | Performed by: NURSE PRACTITIONER

## 2021-10-10 PROCEDURE — U0003 INFECTIOUS AGENT DETECTION BY NUCLEIC ACID (DNA OR RNA); SEVERE ACUTE RESPIRATORY SYNDROME CORONAVIRUS 2 (SARS-COV-2) (CORONAVIRUS DISEASE [COVID-19]), AMPLIFIED PROBE TECHNIQUE, MAKING USE OF HIGH THROUGHPUT TECHNOLOGIES AS DESCRIBED BY CMS-2020-01-R: HCPCS | Performed by: NURSE PRACTITIONER

## 2021-10-10 PROCEDURE — U0005 INFEC AGEN DETEC AMPLI PROBE: HCPCS | Performed by: NURSE PRACTITIONER

## 2021-10-10 PROCEDURE — 87651 STREP A DNA AMP PROBE: CPT | Performed by: NURSE PRACTITIONER

## 2021-10-10 ASSESSMENT — ENCOUNTER SYMPTOMS
MYALGIAS: 0
COUGH: 0
HEADACHES: 0
FEVER: 0
FATIGUE: 0
SORE THROAT: 1
CHILLS: 0

## 2021-10-10 NOTE — PROGRESS NOTES
Assessment & Plan     Throat pain    - Streptococcus A Rapid Screen w/Reflex to PCR - Clinic Collect  - Symptomatic COVID-19 Virus (Coronavirus) by PCR Nose  - Group A Streptococcus PCR Throat Swab    Nasal congestion       Isolate pending Covid results.  Tylenol, Mucinex.  Viral illness versus allergic rhinitis.    OTCs discussed such as Mucinex and Tylenol.            No follow-ups on file.    Zoe Raman, CNP  [facility]    Humberto Glover is a 41 year old male who presents to clinic today for the following health issues:  Chief Complaint   Patient presents with     Pharyngitis     Nasal Congestion     stuffy burns in nose      HPI  Throat pain with mild stuffy nose with ethmoid sinus area pressure rated 2 out of 10 and thin, greenish postnasal drainage.  Started 4 days ago. Does have a history of seasonal allergies, but has not felt like this in the past.  Is not taking anything for allergies.  Is mainly looking for strep and Covid testing today.    Was Covid + May 5, 2021. No vaccines yet.  Is planning on getting vaccinated next week.      Review of Systems   Constitutional: Negative for chills, fatigue and fever.   HENT: Positive for congestion, postnasal drip and sore throat. Negative for ear pain.    Respiratory: Negative for cough.    Musculoskeletal: Negative for myalgias.   Neurological: Negative for headaches.           Objective    /87   Pulse 72   Temp 98.5  F (36.9  C)   Resp 14   Wt 112.9 kg (249 lb)   SpO2 98%   BMI 33.08 kg/m    Physical Exam  Constitutional:       Appearance: He is well-developed.   HENT:      Right Ear: External ear normal.      Left Ear: External ear normal.      Mouth/Throat:      Pharynx: Posterior oropharyngeal erythema present.      Tonsils: No tonsillar exudate or tonsillar abscesses. 1+ on the right. 2+ on the left.   Eyes:      General:         Right eye: No discharge.         Left eye: No discharge.       Conjunctiva/sclera: Conjunctivae normal.   Pulmonary:      Effort: Pulmonary effort is normal.   Musculoskeletal:         General: Normal range of motion.   Skin:     General: Skin is warm.   Neurological:      Mental Status: He is alert and oriented to person, place, and time.   Psychiatric:         Mood and Affect: Mood normal.         Behavior: Behavior normal.         Thought Content: Thought content normal.         Judgment: Judgment normal.

## 2021-10-28 ENCOUNTER — IMMUNIZATION (OUTPATIENT)
Dept: NURSING | Facility: CLINIC | Age: 41
End: 2021-10-28
Payer: COMMERCIAL

## 2021-10-28 PROCEDURE — 91300 PR COVID VAC PFIZER DIL RECON 30 MCG/0.3 ML IM: CPT

## 2021-10-28 PROCEDURE — 0001A PR COVID VAC PFIZER DIL RECON 30 MCG/0.3 ML IM: CPT

## 2021-11-18 ENCOUNTER — IMMUNIZATION (OUTPATIENT)
Dept: NURSING | Facility: CLINIC | Age: 41
End: 2021-11-18
Attending: PEDIATRICS
Payer: COMMERCIAL

## 2021-11-18 ENCOUNTER — NURSE TRIAGE (OUTPATIENT)
Dept: NURSING | Facility: CLINIC | Age: 41
End: 2021-11-18

## 2021-11-18 PROCEDURE — 0002A PR COVID VAC PFIZER DIL RECON 30 MCG/0.3 ML IM: CPT

## 2021-11-18 PROCEDURE — 91300 PR COVID VAC PFIZER DIL RECON 30 MCG/0.3 ML IM: CPT

## 2021-11-18 NOTE — TELEPHONE ENCOUNTER
Patient mother has question about Covid Booster vax, and illness.    She was asking if a COVID booster is recommended for someone who has cold , URI symptoms.    RN tanner advised , Not recommended funtil patient is well/.    Kristal Truong RN RN  [facility] Triage/refill nurse\      Additional Information    Information only question and nurse able to answer    Protocols used: INFORMATION ONLY CALL - NO TRIAGE-A-[place]

## 2021-11-20 ENCOUNTER — OFFICE VISIT (OUTPATIENT)
Dept: FAMILY MEDICINE | Facility: CLINIC | Age: 41
End: 2021-11-20
Payer: COMMERCIAL

## 2021-11-20 ENCOUNTER — ANCILLARY PROCEDURE (OUTPATIENT)
Dept: GENERAL RADIOLOGY | Facility: CLINIC | Age: 41
End: 2021-11-20
Attending: NURSE PRACTITIONER
Payer: COMMERCIAL

## 2021-11-20 VITALS
DIASTOLIC BLOOD PRESSURE: 97 MMHG | SYSTOLIC BLOOD PRESSURE: 137 MMHG | RESPIRATION RATE: 18 BRPM | WEIGHT: 249 LBS | HEART RATE: 122 BPM | TEMPERATURE: 99.9 F | OXYGEN SATURATION: 96 % | BODY MASS INDEX: 33.08 KG/M2

## 2021-11-20 DIAGNOSIS — R06.2 WHEEZING: ICD-10-CM

## 2021-11-20 DIAGNOSIS — J34.89 SINUS PRESSURE: ICD-10-CM

## 2021-11-20 DIAGNOSIS — R50.9 FEVER, UNSPECIFIED FEVER CAUSE: ICD-10-CM

## 2021-11-20 DIAGNOSIS — R06.02 SHORTNESS OF BREATH: ICD-10-CM

## 2021-11-20 DIAGNOSIS — R05.9 COUGH: Primary | ICD-10-CM

## 2021-11-20 DIAGNOSIS — R00.0 TACHYCARDIA: ICD-10-CM

## 2021-11-20 LAB
ATRIAL RATE - MUSE: 103 BPM
BASOPHILS # BLD AUTO: 0.1 10E3/UL (ref 0–0.2)
BASOPHILS NFR BLD AUTO: 1 %
D DIMER PPP FEU-MCNC: <=0.27 UG/ML FEU (ref 0–0.5)
DIASTOLIC BLOOD PRESSURE - MUSE: NORMAL MMHG
EOSINOPHIL # BLD AUTO: 0.3 10E3/UL (ref 0–0.7)
EOSINOPHIL NFR BLD AUTO: 4 %
ERYTHROCYTE [DISTWIDTH] IN BLOOD BY AUTOMATED COUNT: 13 % (ref 10–15)
HCT VFR BLD AUTO: 45 % (ref 40–53)
HGB BLD-MCNC: 15 G/DL (ref 13.3–17.7)
IMM GRANULOCYTES # BLD: 0 10E3/UL
IMM GRANULOCYTES NFR BLD: 0 %
INTERPRETATION ECG - MUSE: NORMAL
LYMPHOCYTES # BLD AUTO: 1.1 10E3/UL (ref 0.8–5.3)
LYMPHOCYTES NFR BLD AUTO: 15 %
MCH RBC QN AUTO: 28.2 PG (ref 26.5–33)
MCHC RBC AUTO-ENTMCNC: 33.3 G/DL (ref 31.5–36.5)
MCV RBC AUTO: 85 FL (ref 78–100)
MONOCYTES # BLD AUTO: 0.7 10E3/UL (ref 0–1.3)
MONOCYTES NFR BLD AUTO: 10 %
NEUTROPHILS # BLD AUTO: 5 10E3/UL (ref 1.6–8.3)
NEUTROPHILS NFR BLD AUTO: 70 %
P AXIS - MUSE: 50 DEGREES
PLATELET # BLD AUTO: 261 10E3/UL (ref 150–450)
PR INTERVAL - MUSE: 142 MS
QRS DURATION - MUSE: 94 MS
QT - MUSE: 324 MS
QTC - MUSE: 424 MS
R AXIS - MUSE: 17 DEGREES
RBC # BLD AUTO: 5.31 10E6/UL (ref 4.4–5.9)
SYSTOLIC BLOOD PRESSURE - MUSE: NORMAL MMHG
T AXIS - MUSE: 28 DEGREES
VENTRICULAR RATE- MUSE: 103 BPM
WBC # BLD AUTO: 7.2 10E3/UL (ref 4–11)

## 2021-11-20 PROCEDURE — U0003 INFECTIOUS AGENT DETECTION BY NUCLEIC ACID (DNA OR RNA); SEVERE ACUTE RESPIRATORY SYNDROME CORONAVIRUS 2 (SARS-COV-2) (CORONAVIRUS DISEASE [COVID-19]), AMPLIFIED PROBE TECHNIQUE, MAKING USE OF HIGH THROUGHPUT TECHNOLOGIES AS DESCRIBED BY CMS-2020-01-R: HCPCS | Performed by: NURSE PRACTITIONER

## 2021-11-20 PROCEDURE — 93010 ELECTROCARDIOGRAM REPORT: CPT | Performed by: INTERNAL MEDICINE

## 2021-11-20 PROCEDURE — 36415 COLL VENOUS BLD VENIPUNCTURE: CPT | Performed by: NURSE PRACTITIONER

## 2021-11-20 PROCEDURE — 93005 ELECTROCARDIOGRAM TRACING: CPT | Performed by: NURSE PRACTITIONER

## 2021-11-20 PROCEDURE — 71046 X-RAY EXAM CHEST 2 VIEWS: CPT | Mod: TC | Performed by: RADIOLOGY

## 2021-11-20 PROCEDURE — 85379 FIBRIN DEGRADATION QUANT: CPT | Performed by: NURSE PRACTITIONER

## 2021-11-20 PROCEDURE — 85025 COMPLETE CBC W/AUTO DIFF WBC: CPT | Performed by: NURSE PRACTITIONER

## 2021-11-20 PROCEDURE — U0005 INFEC AGEN DETEC AMPLI PROBE: HCPCS | Performed by: NURSE PRACTITIONER

## 2021-11-20 PROCEDURE — 99215 OFFICE O/P EST HI 40 MIN: CPT | Performed by: NURSE PRACTITIONER

## 2021-11-20 RX ORDER — ALBUTEROL SULFATE 90 UG/1
2 AEROSOL, METERED RESPIRATORY (INHALATION) EVERY 6 HOURS PRN
Qty: 18 G | Refills: 0 | Status: SHIPPED | OUTPATIENT
Start: 2021-11-20

## 2021-11-20 RX ORDER — DOXYCYCLINE 100 MG/1
100 CAPSULE ORAL 2 TIMES DAILY
Qty: 14 CAPSULE | Refills: 0 | Status: SHIPPED | OUTPATIENT
Start: 2021-11-20 | End: 2021-11-27

## 2021-11-20 RX ORDER — FAMOTIDINE 20 MG/1
20 TABLET, FILM COATED ORAL
COMMUNITY
End: 2023-12-06

## 2021-11-20 RX ORDER — IBUPROFEN 200 MG
800 TABLET ORAL
COMMUNITY

## 2021-11-20 ASSESSMENT — ENCOUNTER SYMPTOMS
SHORTNESS OF BREATH: 1
BACK PAIN: 1
SINUS PAIN: 1
SORE THROAT: 1
FEVER: 1
MYALGIAS: 0
SINUS PRESSURE: 1
COUGH: 1

## 2021-11-20 NOTE — PATIENT INSTRUCTIONS
EKG is looking okay and your CBC for signs of infection is also okay.    I will start you on doxycycline in case this is an early pneumonia since you got suddenly worse after 1 week of cold symptoms.  This also helps with any sort of bacterial sinus infection.    Try using albuterol for wheezing up to 4 times daily.    Come back ASAP with worsening shortness of breath.    I checked a D-dimer for blood clots due to the fast heart rate with pain in your back.  If this is elevated, you may need to come back to the hospital outpatient radiology for CT scan.  If it is normal, I will send it to your MyChart with a note.  If it is not, I will call you.    Isolate pending Covid test results.    If you are not starting to feel much better in about 3 days, try to get in for a follow-up appointment or come back to walk-in as needed.

## 2021-11-20 NOTE — PROGRESS NOTES
Assessment & Plan     Cough    - XR Chest 2 Views  - Symptomatic COVID-19 Virus (Coronavirus) by PCR Nose  - CBC with platelets and differential  - albuterol (PROAIR HFA/PROVENTIL HFA/VENTOLIN HFA) 108 (90 Base) MCG/ACT inhaler  Dispense: 18 g; Refill: 0  - doxycycline hyclate (VIBRAMYCIN) 100 MG capsule  Dispense: 14 capsule; Refill: 0    Fever, unspecified fever cause    - XR Chest 2 Views  - Symptomatic COVID-19 Virus (Coronavirus) by PCR Nose  - doxycycline hyclate (VIBRAMYCIN) 100 MG capsule  Dispense: 14 capsule; Refill: 0    Tachycardia    - D dimer, quantitative  - CBC with platelets and differential  - EKG 12-lead, tracing only    Shortness of breath    - D dimer, quantitative  - CBC with platelets and differential  - EKG 12-lead, tracing only  - albuterol (PROAIR HFA/PROVENTIL HFA/VENTOLIN HFA) 108 (90 Base) MCG/ACT inhaler  Dispense: 18 g; Refill: 0  - doxycycline hyclate (VIBRAMYCIN) 100 MG capsule  Dispense: 14 capsule; Refill: 0    Wheezing    - albuterol (PROAIR HFA/PROVENTIL HFA/VENTOLIN HFA) 108 (90 Base) MCG/ACT inhaler  Dispense: 18 g; Refill: 0    Sinus pressure    - doxycycline hyclate (VIBRAMYCIN) 100 MG capsule  Dispense: 14 capsule; Refill: 0     Patient with family exposure to ride with viral URI symptoms this week.  Received COVID-19 vaccine booster on November 18 and then started becoming more ill which has been worsening rather than improving.  Heart rate was 122 when he came in and he has felt like his heart was racing.  Temp of 100 without ibuprofen or Tylenol today.  On exam, had wheezing and rhonchi located in the left upper lobe.  Chest x-ray was negative.  Also started having pain in the same area where I heard wheezing and rhonchi.    This patient does have pain overlying area of rhonchi and wheezing.  Differential includes early pneumonia, only effects of COVID-19 vaccine, new virus overlying initial virus including COVID-19, PE with tachycardia and pain in back, transition to a  bacterial sinus infection with ongoing facial pain and pressure..    As I am unable to distinguish, patient discharged home with D-dimer pending and heart rate of 103.  We will have him start doxycycline as his symptoms are consistent with an early pneumonia/double sickening.    Patient will be contacted if D-dimer is abnormal.  Patient can access my chart for normal result.          Return today (on 11/20/2021).    Zoe Raman [facility]    Humberto Glover is a 41 year old male who presents to clinic today for the following health issues:  Chief Complaint   Patient presents with     Cough     pressure in left ear      Tachycardia     Headache     Shortness of Breath     HPI    Patient with cold symptoms for about a week prior to onset of worsening symptoms in the last 2 days or so.  He has 3 children have all been sick with URI/RSV and/or ear infections recently.  They all tested negative for COVID-19.      At the onset of symptoms 1 week ago or so, patient had a negative Covid test.  Is starting to have ear pressure in the left ear as well.  Facial pain and congestion.  Is starting to feel little short of breath.  She started feeling isolated back pain in the left upper back about 30 minutes prior to arrival.  Fast heart rate and body aches.  Patient attributes/initially contributed some of the symptoms to the COVID-19 vaccine.  Booster was on 11/18, but has been worsening since and not improving.  States he had 102 fever with previous vaccines.      Has been taking ibuprofen.  Did not take anything today.  Has not had a measured fever over 100.4.    No history of asthma.    Denies leg swelling.  Denies calf pain.  Denies any recent travel.  Is a non-smoker.        Review of Systems   Constitutional: Positive for fever.   HENT: Positive for congestion, ear pain, sinus pressure, sinus pain and sore throat (At onset of symptoms.  Not currently.).    Respiratory:  Positive for cough and shortness of breath.    Musculoskeletal: Positive for back pain (Left upper back). Negative for myalgias.           Objective    BP (!) 137/97   Pulse (!) 122   Temp 99.9  F (37.7  C) (Oral)   Resp 18   Wt 112.9 kg (249 lb)   SpO2 96%   BMI 33.08 kg/m    Physical Exam  Constitutional:       Appearance: He is well-developed. He is not ill-appearing.   HENT:      Right Ear: Tympanic membrane and external ear normal.      Left Ear: Tympanic membrane and external ear normal.      Nose: Congestion present.      Right Sinus: Maxillary sinus tenderness and frontal sinus tenderness present.      Left Sinus: Maxillary sinus tenderness and frontal sinus tenderness present.      Mouth/Throat:      Pharynx: Posterior oropharyngeal erythema present.   Eyes:      General:         Right eye: No discharge.         Left eye: No discharge.      Conjunctiva/sclera: Conjunctivae normal.   Cardiovascular:      Rate and Rhythm: Tachycardia present.      Heart sounds: Normal heart sounds.   Pulmonary:      Effort: Pulmonary effort is normal.      Breath sounds: Wheezing and rhonchi (Left upper lobe) present.   Musculoskeletal:         General: Normal range of motion.   Skin:     General: Skin is warm.   Neurological:      Mental Status: He is alert and oriented to person, place, and time.   Psychiatric:         Mood and Affect: Mood normal.         Behavior: Behavior normal.         Thought Content: Thought content normal.         Judgment: Judgment normal.               EKG Interpretation:      Interpreted by Zoe Raman CNP  Time reviewed:1305   Symptoms at time of EKG: Tachycardia  Rhythm: Normal sinus  and Sinus tachycardia  Rate: 100-110  Axis:   Ectopy: None  Conduction: Normal  ST Segments/ T Waves: No ST-T wave changes and No acute ischemic changes  Q Waves: None      Clinical Impression: sinus tachycardia    I independently visualized the xray: Within normal limits without signs of pneumonia no  other overt abnormalities.      Results for orders placed or performed in visit on 11/20/21 (from the past 24 hour(s))   XR Chest 2 Views    Narrative    EXAM: XR CHEST 2 VW  LOCATION: [facility]  DATE/TIME: 11/20/2021 11:43 AM    INDICATION: Cough, tachycardia, shortness of breath and fever.  COMPARISON: 5/10/2021      Impression    IMPRESSION: Heart size and vascularity are normal. No focal consolidation, pneumothorax nor pleural effusion.   CBC with platelets and differential    Narrative    The following orders were created for panel order CBC with platelets and differential.  Procedure                               Abnormality         Status                     ---------                               -----------         ------                     CBC with platelets and d...[[id number]]                      Final result                 Please view results for these tests on the individual orders.   CBC with platelets and differential   Result Value Ref Range    WBC Count 7.2 4.0 - 11.0 10e3/uL    RBC Count 5.31 4.40 - 5.90 10e6/uL    Hemoglobin 15.0 13.3 - 17.7 g/dL    Hematocrit 45.0 40.0 - 53.0 %    MCV 85 78 - 100 fL    MCH 28.2 26.5 - 33.0 pg    MCHC 33.3 31.5 - 36.5 g/dL    RDW 13.0 10.0 - 15.0 %    Platelet Count 261 150 - 450 10e3/uL    % Neutrophils 70 %    % Lymphocytes 15 %    % Monocytes 10 %    % Eosinophils 4 %    % Basophils 1 %    % Immature Granulocytes 0 %    Absolute Neutrophils 5.0 1.6 - 8.3 10e3/uL    Absolute Lymphocytes 1.1 0.8 - 5.3 10e3/uL    Absolute Monocytes 0.7 0.0 - 1.3 10e3/uL    Absolute Eosinophils 0.3 0.0 - 0.7 10e3/uL    Absolute Basophils 0.1 0.0 - 0.2 10e3/uL    Absolute Immature Granulocytes 0.0 <=0.0 10e3/uL   EKG 12-lead, tracing only   Result Value Ref Range    Systolic Blood Pressure  mmHg    Diastolic Blood Pressure  mmHg    Ventricular Rate 103 BPM    Atrial Rate 103 BPM    AL Interval 142 ms    QRS Duration 94 ms     ms    QTc 424 ms    P  Axis 50 degrees    R AXIS 17 degrees    T Axis 28 degrees    Interpretation ECG       Sinus tachycardia  Otherwise normal ECG  When compared with ECG of 05-MAY-2021 22:17,  No significant change was found  Confirmed by AGUILA XIE MD LOC:[facility] ([id number]) on 11/20/2021 1:17:11 PM

## 2021-11-21 LAB — SARS-COV-2 RNA RESP QL NAA+PROBE: NEGATIVE

## 2022-08-16 DIAGNOSIS — R06.83 SNORING: Primary | ICD-10-CM

## 2022-09-07 ENCOUNTER — ALLIED HEALTH/NURSE VISIT (OUTPATIENT)
Dept: FAMILY MEDICINE | Facility: CLINIC | Age: 42
End: 2022-09-07
Payer: COMMERCIAL

## 2022-09-07 DIAGNOSIS — Z23 NEEDS FLU SHOT: Primary | ICD-10-CM

## 2022-09-07 PROCEDURE — 99207 PR NO CHARGE NURSE ONLY: CPT

## 2022-09-07 PROCEDURE — 90471 IMMUNIZATION ADMIN: CPT

## 2022-09-07 PROCEDURE — 90686 IIV4 VACC NO PRSV 0.5 ML IM: CPT

## 2022-09-18 ENCOUNTER — HEALTH MAINTENANCE LETTER (OUTPATIENT)
Age: 42
End: 2022-09-18

## 2022-09-21 ENCOUNTER — OFFICE VISIT (OUTPATIENT)
Dept: FAMILY MEDICINE | Facility: CLINIC | Age: 42
End: 2022-09-21
Payer: COMMERCIAL

## 2022-09-21 VITALS
DIASTOLIC BLOOD PRESSURE: 80 MMHG | WEIGHT: 258 LBS | OXYGEN SATURATION: 99 % | BODY MASS INDEX: 34.27 KG/M2 | TEMPERATURE: 97.6 F | HEART RATE: 65 BPM | SYSTOLIC BLOOD PRESSURE: 134 MMHG | RESPIRATION RATE: 16 BRPM

## 2022-09-21 DIAGNOSIS — J06.9 VIRAL URI: Primary | ICD-10-CM

## 2022-09-21 PROCEDURE — 99213 OFFICE O/P EST LOW 20 MIN: CPT | Performed by: PHYSICIAN ASSISTANT

## 2022-09-21 RX ORDER — GUAIFENESIN AND DEXTROMETHORPHAN HYDROBROMIDE 600; 30 MG/1; MG/1
1 TABLET, EXTENDED RELEASE ORAL EVERY 12 HOURS
COMMUNITY
End: 2023-10-30

## 2022-09-21 RX ORDER — ALBUTEROL SULFATE 90 UG/1
2 AEROSOL, METERED RESPIRATORY (INHALATION) EVERY 6 HOURS PRN
Qty: 18 G | Refills: 0 | Status: SHIPPED | OUTPATIENT
Start: 2022-09-21

## 2022-09-21 NOTE — PATIENT INSTRUCTIONS
Cough    You were seen today for a cough. This is likely due to a virus and will improve over the next 1-2 weeks on its own.    Symptom management:  - Drink plenty of non-caffeinated fluids  - Avoid smoke exposure  - May use tylenol or ibuprofen for discomfort  - Drink a warm non-caffeinated tea with honey  - Place a warm humidifier in your bedroom at night  - [employer] VaporRub    Reasons to return for re-evaluation:  - Develop a fever 100.4 or higher, current fever worsens, or fever does not improve in 72 hours  - Difficulty breathing or shortness of breath  - Cough continues to worsen including coughing up blood or coughing up thick, colored phlegm  - Unable to tolerate fluids    Otherwise, if symptoms have not improved in 7 days, follow-up with your primary care provider.

## 2022-09-21 NOTE — PROGRESS NOTES
Assessment & Plan:      Problem List Items Addressed This Visit    None     Visit Diagnoses     Viral URI    -  Primary    Relevant Medications    albuterol (PROAIR HFA/PROVENTIL HFA/VENTOLIN HFA) 108 (90 Base) MCG/ACT inhaler        Medical Decision Making  Patient presents with nasal congestion, ear fullness, and cough for 2 to 3 days.  No signs concerning for pneumonia on today's exam.  No signs of respiratory distress and clear lung auscultation.  Patient expresses high concern for bronchitis and history of pneumonia, thus prescribed albuterol inhaler to be used for chest tightness if symptoms progress.  Otherwise, for now, continue with fluids, rest, honey, and over-the-counter analgesics as needed.  Suspect likely viral upper respiratory infection.  Discussed signs of worsening symptoms and when to follow-up with PCP if no symptom improvement.     Subjective:      Adalberto Antonio is a 42 year old male here for evaluation of nasal congestion, ear fullness, and cough.  Onset of symptoms was 2 to 3 days ago.  Associated symptoms include a slight sore throat that is since improved.  Patient does have history of frequent bronchitis and an episode of pneumonia.     The following portions of the patient's history were reviewed and updated as appropriate: allergies, current medications, and problem list.     Review of Systems  Pertinent items are noted in HPI.    Allergies  No Known Allergies    Family History   Problem Relation Age of Onset     No Known Problems Mother      Kidney Cancer Father      Heart Failure Maternal Grandmother      Dementia Maternal Grandfather      Cancer Paternal Grandmother        Social History     Tobacco Use     Smoking status: Never Smoker     Smokeless tobacco: Never Used   Substance Use Topics     Alcohol use: Yes     Comment: Alcoholic Drinks/day: rare        Objective:      /80   Pulse 65   Temp 97.6  F (36.4  C) (Oral)   Resp 16   Wt 117 kg (258 lb)   SpO2 99%   BMI  34.27 kg/m    General appearance - alert, well appearing, and in no distress and non-toxic  Ears - TMs intact with moderate mucoid fluid and bulging bilaterally, no erythema  Nose - normal and patent, no erythema, discharge or polyps  Mouth - mucous membranes moist, pharynx normal without lesions  Neck - supple, no significant adenopathy  Chest - clear to auscultation, no wheezes, rales or rhonchi, symmetric air entry  Heart - normal rate, regular rhythm, normal S1, S2, no murmurs, rubs, clicks or gallops    The use of Dragon/PowerMic dictation services was used to construct the content of this note; any grammatical errors are non-intentional. Please contact the author directly if you are in need of any clarification.

## 2022-09-26 ENCOUNTER — TRANSFERRED RECORDS (OUTPATIENT)
Dept: HEALTH INFORMATION MANAGEMENT | Facility: CLINIC | Age: 42
End: 2022-09-26

## 2022-10-18 ENCOUNTER — OFFICE VISIT (OUTPATIENT)
Dept: FAMILY MEDICINE | Facility: CLINIC | Age: 42
End: 2022-10-18
Payer: COMMERCIAL

## 2022-10-18 VITALS
WEIGHT: 262 LBS | RESPIRATION RATE: 19 BRPM | DIASTOLIC BLOOD PRESSURE: 93 MMHG | OXYGEN SATURATION: 98 % | BODY MASS INDEX: 34.8 KG/M2 | HEART RATE: 81 BPM | SYSTOLIC BLOOD PRESSURE: 139 MMHG | TEMPERATURE: 97.7 F

## 2022-10-18 DIAGNOSIS — J01.90 ACUTE BACTERIAL SINUSITIS: Primary | ICD-10-CM

## 2022-10-18 DIAGNOSIS — B96.89 ACUTE BACTERIAL SINUSITIS: Primary | ICD-10-CM

## 2022-10-18 DIAGNOSIS — H66.001 NON-RECURRENT ACUTE SUPPURATIVE OTITIS MEDIA OF RIGHT EAR WITHOUT SPONTANEOUS RUPTURE OF TYMPANIC MEMBRANE: ICD-10-CM

## 2022-10-18 PROCEDURE — 99214 OFFICE O/P EST MOD 30 MIN: CPT | Performed by: PHYSICIAN ASSISTANT

## 2022-10-18 NOTE — PATIENT INSTRUCTIONS
You were seen today for a sinus infection.    Symptoms management:  - May use Tylenol or Ibuprofen for discomfort and/or fever if present  - May try saline irrigation to relieve congestion (see instructions below)  - Use of nasal steroids (Flonase) as prescribed  - Take antibiotics as prescribed for the full course (even if symptoms have improved)    Reasons to come back for re-evaluation:  - Develop a fever of 100.4F or current fever worsens  - Troubles seeing or double vision  - Swelling or redness around one or both eyes  - Sfiff neck  - Symptoms are not improving over the next 5 days    Buffered normal saline nasal irrigation   The benefits   1. Saline (saltwater) washes the mucus and irritants from your nose.   2. The sinus passages are moisturized.   3. Studies have also shown that a nasal irrigation improves cell function (the cells that move the mucus work better).   The recipe   Use a one-quart glass jar that is thoroughly cleansed.   You may use a large medical syringe (30 cc), water pick with an irrigation tip (preferred method), squeeze bottle, or Neti pot. Do not use a baby bulb syringe. The syringe or pick should be sterilized frequently or replaced every two to three weeks to avoid contamination and infection.   Fill with water that has been distilled, previously boiled, or otherwise sterilized. Plain tap water is not recommended, because it is not necessarily sterile.   Add 1 to 1  heaping teaspoons of pickling/jesse salt. Do not use table salt, because it contains a large number of additives.   Add 1 teaspoon of baking soda (pure bicarbonate).   Mix ingredients together, and store at room temperature. Discard after one week.   You may also make up a solution from premixed packets that are commercially prepared specifically for nasal irrigation.   The instructions   Irrigate your nose with saline one to two times per day.   If you have been told to use nasal medication, you should always use your  saline solution first. The nasal medication is much more effective when sprayed onto clean nasal membranes, and the spray will reach deeper into the nose.   Pour the amount of fluid you plan to use into a clean bowl. Do not put your used syringe back into the storage container, because it contaminates your solution.   You may warm the solution slightly in the microwave, but be sure that the solution is not hot.   Bend over the sink (some people do this in the shower) and squirt the solution into each side of your nose, aiming the stream toward the back of your head, not the top of your head. The solution should flow into one nostril and out of the other, but it will not harm you if you swallow a little.   Some people experience a little burning sensation the first few times they use buffered saline solution, but this usually goes away after they adapt to it.

## 2022-10-18 NOTE — PROGRESS NOTES
Assessment & Plan:      Problem List Items Addressed This Visit    None  Visit Diagnoses     Acute bacterial sinusitis    -  Primary    Relevant Medications    amoxicillin-clavulanate (AUGMENTIN) 875-125 MG tablet    Non-recurrent acute suppurative otitis media of right ear without spontaneous rupture of tympanic membrane        Relevant Medications    amoxicillin-clavulanate (AUGMENTIN) 875-125 MG tablet        Medical Decision Making  Patient presents with cough, sinus congestion, and pressure in the ears for 1 month.  No signs of respiratory distress with clear lung auscultation to make concerns for bacterial pneumonia low.  However, patient does have signs of acute right otitis media and symptoms concerning for bacterial sinusitis.  Will treat patient with oral antibiotics.  Continue with at home nasal steroid spray and inhaler as needed.  Allergies and medication interactions reviewed.  Discussed signs of worsening symptoms and when to follow-up with PCP if no symptom improvement.     Subjective:      Adalberto Antonio is a 42 year old male here for evaluation of ongoing cough, sinus congestion, pressure in ears.  Onset of symptoms was 1 month ago.  Patient was initially seen for suspected viral upper respiratory infection.  He was given albuterol inhaler at that time.  The albuterol has seemed to help with the chest tightness, but does not seem to help resolve the cough.  Patient denies fevers and shortness of breath.     The following portions of the patient's history were reviewed and updated as appropriate: allergies, current medications, and problem list.     Review of Systems  Pertinent items are noted in HPI.    Allergies  No Known Allergies    Family History   Problem Relation Age of Onset     No Known Problems Mother      Kidney Cancer Father      Heart Failure Maternal Grandmother      Dementia Maternal Grandfather      Cancer Paternal Grandmother        Social History     Tobacco Use     Smoking  status: Never     Smokeless tobacco: Never   Substance Use Topics     Alcohol use: Yes     Comment: Alcoholic Drinks/day: rare        Objective:      BP (!) 139/93   Pulse 81   Temp 97.7  F (36.5  C)   Resp 19   Wt 118.8 kg (262 lb)   SpO2 98%   BMI 34.80 kg/m    General appearance - alert, well appearing, and in no distress and non-toxic  Ears - Right: TM intact with purulent fluid, bulging, erythema.  Left: TM intact with mucoid fluid and bulging, no erythema.  Nose - Tenderness to the maxillary sinuses bilaterally.  Mouth - mucous membranes moist, pharynx normal without lesions  Neck - supple, no significant adenopathy  Chest - clear to auscultation, no wheezes, rales or rhonchi, symmetric air entry  Heart - normal rate, regular rhythm, normal S1, S2, no murmurs, rubs, clicks or gallops    The use of Dragon/[employer] dictation services was used to construct the content of this note; any grammatical errors are non-intentional. Please contact the author directly if you are in need of any clarification.

## 2023-04-19 ENCOUNTER — APPOINTMENT (OUTPATIENT)
Dept: CT IMAGING | Facility: CLINIC | Age: 43
End: 2023-04-19
Attending: EMERGENCY MEDICINE
Payer: COMMERCIAL

## 2023-04-19 ENCOUNTER — HOSPITAL ENCOUNTER (EMERGENCY)
Facility: CLINIC | Age: 43
Discharge: HOME | End: 2023-04-19
Attending: EMERGENCY MEDICINE | Admitting: EMERGENCY MEDICINE
Payer: COMMERCIAL

## 2023-04-19 ENCOUNTER — NURSE TRIAGE (OUTPATIENT)
Dept: NURSING | Facility: CLINIC | Age: 43
End: 2023-04-19

## 2023-04-19 VITALS
HEIGHT: 74 IN | DIASTOLIC BLOOD PRESSURE: 87 MMHG | BODY MASS INDEX: 32.73 KG/M2 | RESPIRATION RATE: 16 BRPM | WEIGHT: 255 LBS | SYSTOLIC BLOOD PRESSURE: 142 MMHG | TEMPERATURE: 98.4 F | OXYGEN SATURATION: 96 % | HEART RATE: 77 BPM

## 2023-04-19 VITALS
SYSTOLIC BLOOD PRESSURE: 165 MMHG | WEIGHT: 262.1 LBS | BODY MASS INDEX: 33.64 KG/M2 | RESPIRATION RATE: 16 BRPM | HEIGHT: 74 IN | TEMPERATURE: 97.6 F | HEART RATE: 74 BPM | DIASTOLIC BLOOD PRESSURE: 93 MMHG | OXYGEN SATURATION: 99 %

## 2023-04-19 DIAGNOSIS — N20.1 URETEROLITHIASIS: ICD-10-CM

## 2023-04-19 DIAGNOSIS — N20.0 KIDNEY STONE: ICD-10-CM

## 2023-04-19 LAB
ALBUMIN SERPL-MCNC: 4.5 G/DL (ref 3.5–5)
ALBUMIN UR-MCNC: 20 MG/DL
ALBUMIN UR-MCNC: 30 MG/DL
ALP SERPL-CCNC: 74 U/L (ref 45–120)
ALT SERPL W P-5'-P-CCNC: 57 U/L (ref 0–45)
ANION GAP SERPL CALCULATED.3IONS-SCNC: 10 MMOL/L (ref 5–18)
ANION GAP SERPL CALCULATED.3IONS-SCNC: 8 MMOL/L (ref 5–18)
APPEARANCE UR: CLEAR
APPEARANCE UR: CLEAR
AST SERPL W P-5'-P-CCNC: 32 U/L (ref 0–40)
BASOPHILS # BLD AUTO: 0.1 10E3/UL (ref 0–0.2)
BASOPHILS # BLD AUTO: 0.1 10E3/UL (ref 0–0.2)
BASOPHILS NFR BLD AUTO: 1 %
BASOPHILS NFR BLD AUTO: 1 %
BILIRUB SERPL-MCNC: 0.9 MG/DL (ref 0–1)
BILIRUB UR QL STRIP: NEGATIVE
BILIRUB UR QL STRIP: NEGATIVE
BUN SERPL-MCNC: 13 MG/DL (ref 8–22)
BUN SERPL-MCNC: 14 MG/DL (ref 8–22)
C REACTIVE PROTEIN LHE: 0.1 MG/DL (ref 0–?)
CALCIUM SERPL-MCNC: 9.3 MG/DL (ref 8.5–10.5)
CALCIUM SERPL-MCNC: 9.5 MG/DL (ref 8.5–10.5)
CHLORIDE BLD-SCNC: 107 MMOL/L (ref 98–107)
CHLORIDE BLD-SCNC: 109 MMOL/L (ref 98–107)
CO2 SERPL-SCNC: 23 MMOL/L (ref 22–31)
CO2 SERPL-SCNC: 24 MMOL/L (ref 22–31)
COLOR UR AUTO: ABNORMAL
COLOR UR AUTO: YELLOW
CREAT SERPL-MCNC: 0.98 MG/DL (ref 0.7–1.3)
CREAT SERPL-MCNC: 1.28 MG/DL (ref 0.7–1.3)
EOSINOPHIL # BLD AUTO: 0.1 10E3/UL (ref 0–0.7)
EOSINOPHIL # BLD AUTO: 0.3 10E3/UL (ref 0–0.7)
EOSINOPHIL NFR BLD AUTO: 1 %
EOSINOPHIL NFR BLD AUTO: 4 %
ERYTHROCYTE [DISTWIDTH] IN BLOOD BY AUTOMATED COUNT: 13.4 % (ref 10–15)
ERYTHROCYTE [DISTWIDTH] IN BLOOD BY AUTOMATED COUNT: 13.4 % (ref 10–15)
GFR SERPL CREATININE-BSD FRML MDRD: 71 ML/MIN/1.73M2
GFR SERPL CREATININE-BSD FRML MDRD: >90 ML/MIN/1.73M2
GLUCOSE BLD-MCNC: 110 MG/DL (ref 70–125)
GLUCOSE BLD-MCNC: 122 MG/DL (ref 70–125)
GLUCOSE UR STRIP-MCNC: NEGATIVE MG/DL
GLUCOSE UR STRIP-MCNC: NEGATIVE MG/DL
HCT VFR BLD AUTO: 44.3 % (ref 40–53)
HCT VFR BLD AUTO: 45.7 % (ref 40–53)
HGB BLD-MCNC: 14.8 G/DL (ref 13.3–17.7)
HGB BLD-MCNC: 14.8 G/DL (ref 13.3–17.7)
HGB UR QL STRIP: ABNORMAL
HGB UR QL STRIP: NEGATIVE
IMM GRANULOCYTES # BLD: 0 10E3/UL
IMM GRANULOCYTES # BLD: 0.1 10E3/UL
IMM GRANULOCYTES NFR BLD: 0 %
IMM GRANULOCYTES NFR BLD: 1 %
KETONES UR STRIP-MCNC: ABNORMAL MG/DL
KETONES UR STRIP-MCNC: NEGATIVE MG/DL
LEUKOCYTE ESTERASE UR QL STRIP: NEGATIVE
LEUKOCYTE ESTERASE UR QL STRIP: NEGATIVE
LIPASE SERPL-CCNC: 39 U/L (ref 0–52)
LYMPHOCYTES # BLD AUTO: 1.6 10E3/UL (ref 0.8–5.3)
LYMPHOCYTES # BLD AUTO: 2.5 10E3/UL (ref 0.8–5.3)
LYMPHOCYTES NFR BLD AUTO: 14 %
LYMPHOCYTES NFR BLD AUTO: 31 %
MCH RBC QN AUTO: 28.5 PG (ref 26.5–33)
MCH RBC QN AUTO: 28.6 PG (ref 26.5–33)
MCHC RBC AUTO-ENTMCNC: 32.4 G/DL (ref 31.5–36.5)
MCHC RBC AUTO-ENTMCNC: 33.4 G/DL (ref 31.5–36.5)
MCV RBC AUTO: 86 FL (ref 78–100)
MCV RBC AUTO: 88 FL (ref 78–100)
MONOCYTES # BLD AUTO: 0.6 10E3/UL (ref 0–1.3)
MONOCYTES # BLD AUTO: 0.7 10E3/UL (ref 0–1.3)
MONOCYTES NFR BLD AUTO: 6 %
MONOCYTES NFR BLD AUTO: 8 %
MUCOUS THREADS #/AREA URNS LPF: PRESENT /LPF
MUCOUS THREADS #/AREA URNS LPF: PRESENT /LPF
NEUTROPHILS # BLD AUTO: 4.6 10E3/UL (ref 1.6–8.3)
NEUTROPHILS # BLD AUTO: 9.5 10E3/UL (ref 1.6–8.3)
NEUTROPHILS NFR BLD AUTO: 56 %
NEUTROPHILS NFR BLD AUTO: 77 %
NITRATE UR QL: NEGATIVE
NITRATE UR QL: NEGATIVE
NRBC # BLD AUTO: 0 10E3/UL
NRBC # BLD AUTO: 0 10E3/UL
NRBC BLD AUTO-RTO: 0 /100
NRBC BLD AUTO-RTO: 0 /100
PH UR STRIP: 5.5 [PH] (ref 5–7)
PH UR STRIP: 7 [PH] (ref 5–7)
PLATELET # BLD AUTO: 297 10E3/UL (ref 150–450)
PLATELET # BLD AUTO: 308 10E3/UL (ref 150–450)
POTASSIUM BLD-SCNC: 3.8 MMOL/L (ref 3.5–5)
POTASSIUM BLD-SCNC: 4.3 MMOL/L (ref 3.5–5)
PROT SERPL-MCNC: 7.6 G/DL (ref 6–8)
RBC # BLD AUTO: 5.18 10E6/UL (ref 4.4–5.9)
RBC # BLD AUTO: 5.19 10E6/UL (ref 4.4–5.9)
RBC URINE: 2 /HPF
RBC URINE: >182 /HPF
SODIUM SERPL-SCNC: 139 MMOL/L (ref 136–145)
SODIUM SERPL-SCNC: 142 MMOL/L (ref 136–145)
SP GR UR STRIP: 1.03 (ref 1–1.03)
SP GR UR STRIP: 1.03 (ref 1–1.03)
UROBILINOGEN UR STRIP-MCNC: <2 MG/DL
UROBILINOGEN UR STRIP-MCNC: <2 MG/DL
WBC # BLD AUTO: 12.1 10E3/UL (ref 4–11)
WBC # BLD AUTO: 8.2 10E3/UL (ref 4–11)
WBC URINE: 2 /HPF
WBC URINE: 4 /HPF
YEAST #/AREA URNS HPF: ABNORMAL /HPF

## 2023-04-19 PROCEDURE — 250N000011 HC RX IP 250 OP 636: Performed by: EMERGENCY MEDICINE

## 2023-04-19 PROCEDURE — 96361 HYDRATE IV INFUSION ADD-ON: CPT

## 2023-04-19 PROCEDURE — 99285 EMERGENCY DEPT VISIT HI MDM: CPT | Mod: 25,27

## 2023-04-19 PROCEDURE — 83690 ASSAY OF LIPASE: CPT | Performed by: EMERGENCY MEDICINE

## 2023-04-19 PROCEDURE — 36415 COLL VENOUS BLD VENIPUNCTURE: CPT | Performed by: EMERGENCY MEDICINE

## 2023-04-19 PROCEDURE — 96374 THER/PROPH/DIAG INJ IV PUSH: CPT

## 2023-04-19 PROCEDURE — 96375 TX/PRO/DX INJ NEW DRUG ADDON: CPT

## 2023-04-19 PROCEDURE — 85025 COMPLETE CBC W/AUTO DIFF WBC: CPT | Performed by: EMERGENCY MEDICINE

## 2023-04-19 PROCEDURE — 81001 URINALYSIS AUTO W/SCOPE: CPT | Performed by: EMERGENCY MEDICINE

## 2023-04-19 PROCEDURE — 250N000013 HC RX MED GY IP 250 OP 250 PS 637: Performed by: EMERGENCY MEDICINE

## 2023-04-19 PROCEDURE — 74176 CT ABD & PELVIS W/O CONTRAST: CPT

## 2023-04-19 PROCEDURE — 80053 COMPREHEN METABOLIC PANEL: CPT | Performed by: EMERGENCY MEDICINE

## 2023-04-19 PROCEDURE — 258N000003 HC RX IP 258 OP 636: Performed by: EMERGENCY MEDICINE

## 2023-04-19 PROCEDURE — 99285 EMERGENCY DEPT VISIT HI MDM: CPT | Mod: 25

## 2023-04-19 PROCEDURE — 86140 C-REACTIVE PROTEIN: CPT | Performed by: EMERGENCY MEDICINE

## 2023-04-19 RX ORDER — DIMENHYDRINATE 50 MG
50 TABLET ORAL EVERY 6 HOURS PRN
Qty: 28 TABLET | Refills: 0 | Status: SHIPPED | OUTPATIENT
Start: 2023-04-19 | End: 2023-04-26

## 2023-04-19 RX ORDER — HYDROMORPHONE HYDROCHLORIDE 1 MG/ML
0.5 INJECTION, SOLUTION INTRAMUSCULAR; INTRAVENOUS; SUBCUTANEOUS
Status: DISCONTINUED | OUTPATIENT
Start: 2023-04-19 | End: 2023-04-19 | Stop reason: HOSPADM

## 2023-04-19 RX ORDER — ONDANSETRON 8 MG/1
8 TABLET, ORALLY DISINTEGRATING ORAL EVERY 8 HOURS PRN
Qty: 12 TABLET | Refills: 0 | Status: SHIPPED | OUTPATIENT
Start: 2023-04-19 | End: 2023-10-30

## 2023-04-19 RX ORDER — ACETAMINOPHEN 500 MG
1000 TABLET ORAL EVERY 6 HOURS
Qty: 56 TABLET | Refills: 0 | Status: SHIPPED | OUTPATIENT
Start: 2023-04-19 | End: 2023-04-26

## 2023-04-19 RX ORDER — OXYCODONE HYDROCHLORIDE 5 MG/1
5 TABLET ORAL EVERY 4 HOURS PRN
Qty: 8 TABLET | Refills: 0 | Status: SHIPPED | OUTPATIENT
Start: 2023-04-19 | End: 2023-04-23

## 2023-04-19 RX ORDER — KETOROLAC TROMETHAMINE 15 MG/ML
15 INJECTION, SOLUTION INTRAMUSCULAR; INTRAVENOUS ONCE
Status: COMPLETED | OUTPATIENT
Start: 2023-04-19 | End: 2023-04-19

## 2023-04-19 RX ORDER — OXYCODONE HYDROCHLORIDE 5 MG/1
5 TABLET ORAL ONCE
Status: COMPLETED | OUTPATIENT
Start: 2023-04-19 | End: 2023-04-19

## 2023-04-19 RX ORDER — IBUPROFEN 200 MG
400 TABLET ORAL EVERY 6 HOURS
Qty: 56 TABLET | Refills: 0 | Status: SHIPPED | OUTPATIENT
Start: 2023-04-19 | End: 2023-04-26

## 2023-04-19 RX ORDER — ONDANSETRON 2 MG/ML
4 INJECTION INTRAMUSCULAR; INTRAVENOUS ONCE
Status: COMPLETED | OUTPATIENT
Start: 2023-04-19 | End: 2023-04-19

## 2023-04-19 RX ORDER — KETOROLAC TROMETHAMINE 30 MG/ML
30 INJECTION, SOLUTION INTRAMUSCULAR; INTRAVENOUS ONCE
Status: COMPLETED | OUTPATIENT
Start: 2023-04-19 | End: 2023-04-19

## 2023-04-19 RX ORDER — ACETAMINOPHEN 325 MG/1
975 TABLET ORAL ONCE
Status: COMPLETED | OUTPATIENT
Start: 2023-04-19 | End: 2023-04-19

## 2023-04-19 RX ADMIN — OXYCODONE HYDROCHLORIDE 5 MG: 5 TABLET ORAL at 14:00

## 2023-04-19 RX ADMIN — SODIUM CHLORIDE 1000 ML: 9 INJECTION, SOLUTION INTRAVENOUS at 14:00

## 2023-04-19 RX ADMIN — KETOROLAC TROMETHAMINE 30 MG: 30 INJECTION, SOLUTION INTRAMUSCULAR; INTRAVENOUS at 02:20

## 2023-04-19 RX ADMIN — ONDANSETRON 4 MG: 2 INJECTION INTRAMUSCULAR; INTRAVENOUS at 02:20

## 2023-04-19 RX ADMIN — Medication 50 MG: at 02:02

## 2023-04-19 RX ADMIN — KETOROLAC TROMETHAMINE 15 MG: 15 INJECTION, SOLUTION INTRAMUSCULAR; INTRAVENOUS at 14:00

## 2023-04-19 RX ADMIN — HYDROMORPHONE HYDROCHLORIDE 0.5 MG: 1 INJECTION, SOLUTION INTRAMUSCULAR; INTRAVENOUS; SUBCUTANEOUS at 16:32

## 2023-04-19 RX ADMIN — SODIUM CHLORIDE 1000 ML: 9 INJECTION, SOLUTION INTRAVENOUS at 02:18

## 2023-04-19 RX ADMIN — ACETAMINOPHEN 975 MG: 325 TABLET ORAL at 02:02

## 2023-04-19 ASSESSMENT — ENCOUNTER SYMPTOMS
NAUSEA: 1
DYSURIA: 0
HEMATURIA: 0
BACK PAIN: 1
ABDOMINAL PAIN: 1
FLANK PAIN: 1

## 2023-04-19 ASSESSMENT — ACTIVITIES OF DAILY LIVING (ADL)
ADLS_ACUITY_SCORE: 35
ADLS_ACUITY_SCORE: 35

## 2023-04-19 NOTE — Clinical Note
Adalberto Marisela was seen and treated in our emergency department on 4/19/2023.  He may return to work on 04/24/2023.  Kidney stone     If you have any questions or concerns, please don't hesitate to call.      Pankaj Drake MD

## 2023-04-19 NOTE — TELEPHONE ENCOUNTER
"Patient has a kidney stone and was seen in ER sent him home and pain is currently \"10 out of 10\".  Spouse Andreina is calling and Adalberto is also on the line with speaker phone.  Patient went to urinate and pain started in immediately.   Pain is on the groin stomach and back and kidney area.  [facility] and Andreina states that there is no openings.  Adalberto is groaning with pain.  FNA advised to go to ED and Andreina and Adalberto agree.     Reason for Disposition    [1] SEVERE pain (e.g., excruciating, scale 8-10) AND [2] not improved after pain medicine    Additional Information    Negative: Shock suspected (e.g., cold/pale/clammy skin, too weak to stand, low BP, rapid pulse)    Negative: Sounds like a life-threatening emergency to the triager    Negative: [1] Unable to urinate (or only a few drops) > 4 hours AND [2] bladder feels very full (e.g., palpable bladder or strong urge to urinate)    Protocols used: KIDNEY STONE FOLLOW-UP CALL-A-AH      "

## 2023-04-19 NOTE — ED PROVIDER NOTES
EMERGENCY DEPARTMENT ENCOUNTER      NAME: Adalberto Antonio  AGE: 43 year old male  YOB: 1980  MRN: [id number]  EVALUATION DATE & TIME: 4/19/2023  1:51 AM    PCP: Tanisha Goyal    ED PROVIDER: Sarah Kelley M.D.      Chief Complaint   Patient presents with     Flank Pain                FINAL IMPRESSION:  1. Ureterolithiasis        MEDICAL DECISION MAKING:    Pertinent Labs & Imaging studies reviewed. (See chart for details)  ED Course as of 04/19/23 0313   Wed Apr 19, 2023   0208 Afebrile.  Vital signs here with some hypertension, otherwise unremarkable.  Patient is coming in with right-sided flank pain.  Says it started earlier this evening in his right lower quadrant radiated down towards his right testicle.  Initially just dull ache.  Started at about 6 PM.  No pain with urination.  No hematuria.  Woke up at 1:00 in the morning and went to use the bathroom and pain increased significantly.  Radiated to his right flank and some pain in his right lower back.  History of kidney stone in the past back in 2005 and he states it feels similar.  At that time he had to have kidney stone retrieval performed.  No medications taken prior to arrival.  Slight nausea secondary to pain.    Exam for patient here with mild right-sided CVA tenderness with percussion.  Otherwise mild suprapubic tenderness but otherwise unremarkable.    Give pain medications, antinausea medications, fluid, check labs, CT scan abdomen pelvis.   0311 Patient's labs back here with hematuria microscopic, otherwise labs unremarkable.  CT scan here does show ureterolithiasis with distal right kidney stone with mild hydronephrosis.  Updated patient, he is feeling better after medications.  Will discharge home with follow-up with [facility].         Medical Decision Making    History:    Supplemental history from: Documented in chart, if applicable    External Record(s) reviewed: Documented in chart, if applicable.    Work Up:    Chart  documentation includes differential considered and any EKGs or imaging independently interpreted by provider, where specified.    In additional to work up documented, I considered the following work up: Documented in chart, if applicable.    External consultation:    Discussion of management with another provider: Documented in chart, if applicable    Complicating factors:    Care impacted by chronic illness: N/A    Care affected by social determinants of health: N/A    Disposition considerations: Discharge. I prescribed additional prescription strength medication(s) as charted. See documentation for any additional details.          Critical care: 0 minutes excluding separately billable procedures.  Includes bedside management, time reviewing test results, review of records, discussing the case with staff, documenting the medical record and time spent with family members (or surrogate decision makers) discussing specific treatment issues.          ED COURSE:  1:54 AM I met with the patient, obtained history, performed an initial exam, and discussed options and plan for diagnostics and treatment here in the ED.  3:06 AM I updated the patient.     The importance of close follow up was discussed. We reviewed warning signs and symptoms, and I instructed Mr. Antonio to return to the emergency department immediately if he develops any new or worsening symptoms. I provided additional verbal discharge instructions. Mr. Antonio expressed understanding and agreement with this plan of care, his questions were answered, and he was discharged in stable condition.     MEDICATIONS GIVEN IN THE EMERGENCY:  Medications   0.9% sodium chloride BOLUS (1,000 mLs Intravenous $New Bag 4/19/23 0218)   dimenhyDRINATE chew tab 50 mg (50 mg Oral $Given 4/19/23 0202)   ondansetron (ZOFRAN) injection 4 mg (4 mg Intravenous $Given 4/19/23 0220)   ketorolac (TORADOL) injection 30 mg (30 mg Intravenous $Given 4/19/23 0220)   acetaminophen  (TYLENOL) tablet 975 mg (975 mg Oral $Given 4/19/23 0202)       NEW PRESCRIPTIONS STARTED AT TODAY'S ER VISIT:  New Prescriptions    ACETAMINOPHEN (TYLENOL) 500 MG TABLET    Take 2 tablets (1,000 mg) by mouth every 6 hours for 7 days    DIMENHYDRINATE (DRAMAMINE) 50 MG TABLET    Take 1 tablet (50 mg) by mouth every 6 hours as needed for other (kidney stone pain management)    IBUPROFEN (ADVIL/MOTRIN) 200 MG TABLET    Take 2 tablets (400 mg) by mouth every 6 hours for 7 days    ONDANSETRON (ZOFRAN ODT) 8 MG ODT TAB    Take 1 tablet (8 mg) by mouth every 8 hours as needed for nausea    OXYCODONE (ROXICODONE) 5 MG TABLET    Take 1 tablet (5 mg) by mouth every 4 hours as needed for severe pain If pain is not improved with acetaminophen and ibuprofen.          =================================================================    HPI    Patient information was obtained from: Patient     Use of : N/A         Adalberto Antonio is a 43 year old male who has a pertinent medical history of nephrolithiasis who presents flank pain.    At 6:00 PM (~8 hours ago), the patient reports an onset of right lower quadrant pain with radiation down towards his right testicle. The patient reports that his pain was initially a dull ache. He denies pain with urination. No hematuria. He endorses slight nausea secondary to pain. Today, the patient woke up at 1:00 AM (~1 hour ago) to use the bathroom, when he noticed his pain increase significantly. The patient reports that his pain has radiated to his right flank and notes of some pain in his right lower back. The patient has a history o of kidney stone in the past back in 2005 (~18 years ago) and he states his symptoms feel similar. At that time he had to have kidney stone retrieval performed. He did not take any medications prior to arrival. The patient otherwise denies any other symptoms or complaints at this time.     REVIEW OF SYSTEMS   Review of Systems   Gastrointestinal:  Positive for abdominal pain and nausea.   Genitourinary: Positive for flank pain. Negative for dysuria and hematuria.   Musculoskeletal: Positive for back pain.   All other systems reviewed and are negative.        PAST MEDICAL HISTORY:  Past Medical History:   Diagnosis Date     Nephrolithiasis        PAST SURGICAL HISTORY:  Past Surgical History:   Procedure Laterality Date     LITHOTRIPSY         CURRENT MEDICATIONS:    No current facility-administered medications for this encounter.    Current Outpatient Medications:      acetaminophen (TYLENOL) 500 MG tablet, Take 2 tablets (1,000 mg) by mouth every 6 hours for 7 days, Disp: 56 tablet, Rfl: 0     dimenhyDRINATE (DRAMAMINE) 50 MG tablet, Take 1 tablet (50 mg) by mouth every 6 hours as needed for other (kidney stone pain management), Disp: 28 tablet, Rfl: 0     ibuprofen (ADVIL/MOTRIN) 200 MG tablet, Take 2 tablets (400 mg) by mouth every 6 hours for 7 days, Disp: 56 tablet, Rfl: 0     ondansetron (ZOFRAN ODT) 8 MG ODT tab, Take 1 tablet (8 mg) by mouth every 8 hours as needed for nausea, Disp: 12 tablet, Rfl: 0     oxyCODONE (ROXICODONE) 5 MG tablet, Take 1 tablet (5 mg) by mouth every 4 hours as needed for severe pain If pain is not improved with acetaminophen and ibuprofen., Disp: 8 tablet, Rfl: 0     albuterol (PROAIR HFA/PROVENTIL HFA/VENTOLIN HFA) 108 (90 Base) MCG/ACT inhaler, Inhale 2 puffs into the lungs every 6 hours as needed for shortness of breath / dyspnea or wheezing (Patient not taking: Reported on 10/18/2022), Disp: 18 g, Rfl: 0     albuterol (PROAIR HFA/PROVENTIL HFA/VENTOLIN HFA) 108 (90 Base) MCG/ACT inhaler, Inhale 2 puffs into the lungs every 6 hours as needed for shortness of breath / dyspnea or wheezing, Disp: 18 g, Rfl: 0     dextromethorphan-guaiFENesin (MUCINEX DM)  MG 12 hr tablet, Take 1 tablet by mouth every 12 hours (Patient not taking: Reported on 10/18/2022), Disp: , Rfl:      famotidine (PEPCID) 20 MG tablet, Take 20 mg by  "mouth, Disp: , Rfl:      famotidine (PEPCID) 20 MG tablet, [FAMOTIDINE (PEPCID) 20 MG TABLET] Take 20 mg by mouth 2 (two) times a day. (Patient not taking: Reported on 10/18/2022), Disp: , Rfl:      ibuprofen (ADVIL/MOTRIN) 200 MG tablet, Take 800 mg by mouth (Patient not taking: Reported on 10/18/2022), Disp: , Rfl:     ALLERGIES:  No Known Allergies    FAMILY HISTORY:  Family History   Problem Relation Age of Onset     No Known Problems Mother      Kidney Cancer Father      Heart Failure Maternal Grandmother      Dementia Maternal Grandfather      Cancer Paternal Grandmother        SOCIAL HISTORY:   Social History     Socioeconomic History     Marital status:    Tobacco Use     Smoking status: Never     Smokeless tobacco: Never   Substance and Sexual Activity     Alcohol use: Yes     Comment: Alcoholic Drinks/day: rare     Drug use: No     Sexual activity: Yes     Partners: Female     Comment:        PHYSICAL EXAM:    Vitals: BP (!) 145/86   Pulse 82   Temp 98.4  F (36.9  C) (Oral)   Resp 18   Ht 1.88 m (6' 2\")   Wt 115.7 kg (255 lb)   SpO2 96%   BMI 32.74 kg/m     General:. Alert and interactive, comfortable appearing.  HENT: Oropharynx without erythema or exudates. MMM.  TMs clear bilaterally.  Eyes: Pupils mid-sized and equally reactive.   Neck: Full AROM.  No midline tenderness to palpation.  Cardiovascular: Regular rate and rhythm. Peripheral pulses 2+ bilaterally.  Chest/Pulmonary: Normal work of breathing. Lung sounds clear and equal throughout, no wheezes or crackles. No chest wall tenderness or deformities.  Abdomen: Soft, nondistended. Mild suprapubic tenderness without guarding or rebound.  Back/Spine: No midline tenderness. Mild right-sided CVA tenderness with percussion.    Extremities: Normal ROM of all major joints. No lower extremity edema.   Skin: Warm and dry. Normal skin color.   Neuro: Speech clear. CNs grossly intact. Moves all extremities appropriately. Strength and " sensation grossly intact to all extremities.   Psych: Normal affect/mood, cooperative, memory appropriate.     LAB:  All pertinent labs reviewed and interpreted.  Labs Ordered and Resulted from Time of ED Arrival to Time of ED Departure   BASIC METABOLIC PANEL - Abnormal       Result Value    Sodium 142      Potassium 3.8      Chloride 109 (*)     Carbon Dioxide (CO2) 23      Anion Gap 10      Urea Nitrogen 13      Creatinine 0.98      Calcium 9.3      Glucose 110      GFR Estimate >90     ROUTINE UA WITH MICROSCOPIC REFLEX TO CULTURE - Abnormal    Color Urine Yellow      Appearance Urine Clear      Glucose Urine Negative      Bilirubin Urine Negative      Ketones Urine Negative      Specific Gravity Urine 1.026      Blood Urine >1.0 mg/dL (*)     pH Urine 5.5      Protein Albumin Urine 20 (*)     Urobilinogen Urine <2.0      Nitrite Urine Negative      Leukocyte Esterase Urine Negative      Mucus Urine Present (*)     RBC Urine >182 (*)     WBC Urine 2     CRP INFLAMMATION - Normal    CRP 0.1     CBC WITH PLATELETS AND DIFFERENTIAL    WBC Count 8.2      RBC Count 5.19      Hemoglobin 14.8      Hematocrit 45.7      MCV 88      MCH 28.5      MCHC 32.4      RDW 13.4      Platelet Count 308      % Neutrophils 56      % Lymphocytes 31      % Monocytes 8      % Eosinophils 4      % Basophils 1      % Immature Granulocytes 0      NRBCs per 100 WBC 0      Absolute Neutrophils 4.6      Absolute Lymphocytes 2.5      Absolute Monocytes 0.6      Absolute Eosinophils 0.3      Absolute Basophils 0.1      Absolute Immature Granulocytes 0.0      Absolute NRBCs 0.0         RADIOLOGY:  CT Abdomen Pelvis w/o Contrast   Final Result   IMPRESSION:    1.  2 x 1.5 mm right distal ureteral calculi with mild proximal hydroureter             EKG:  See MDM  I have independently reviewed and interpreted the EKG(s) documented above.         I, Vijay Perez, am serving as a scribe to document services personally performed by Dr. Sarah Kelley   based on my observation and the provider's statements to me. I, Sarah Kelley MD attest that Vijay Perez is acting in a scribe capacity, has observed my performance of the services and has documented them in accordance with my direction.      Sarah Kelley M.D.  Emergency Medicine  [facility] EMERGENCY ROOM  [address] [address]  [phone]  Dept: [phone]     Sarah Kelley MD  04/19/23 0314

## 2023-04-19 NOTE — TELEPHONE ENCOUNTER
"Pt's wife calling re: kidney stone pain. No C2C on file, but pt there who gives verbal consent.    Pt was seen in  ED this morning at 0200 and Dx with a kidney stone. Was told to follow up with the [facility]. Called [facility] and was told that the provider at the [place] location is on leave and they are not accepting new patients. Only have availability at the [facility], which she says would be to difficult for them to get to.    Took 2000mg (accidentally) of Tylenol, 5mg oxycodone and dramamine around 11am. Has been able to urinate in the last 4 hours, but feels like he has to go to the bathroom all the time. Rates pain 8/10 right now, was 10/10 before the pain meds. C/o lower back pain on the right. Denies fever or vomiting.     Recommend that pt go back to the ED if he is still in severe pain. However, as the phone call continues, pt says his pain is continuing to get better. By the end of the call, rates his pain 7/10 and wife says he is \"looking more relaxed.\" Pt's wife says that [facility] recommended they call [facility], who has a [place] office that might be more convienent for them. Advised to call either [facility] or [facility] to set up an appointment asap. If worsening of pain, should go back to the ED. Also, do not take any additional Tylenol until after 7pm tonight. Can have ibuprofen. Wife verbalized understanding.    Destini Carroll, RN, BSN  [facility]   Triage Nurse Advisor          Reason for Disposition    DOUBLE DOSE (an extra dose or lesser amount) of over-the-counter (OTC) drug and NO symptoms    [1] MODERATE pain (e.g., interferes with normal activities) AND [2] constant AND [3] lasting longer than 4 hours AND [4] NOT improved with pain medicine    Additional Information    Negative: Shock suspected (e.g., cold/pale/clammy skin, too weak to stand, low BP, rapid pulse)    Negative: Sounds like a life-threatening emergency to the triager    Negative: HARMLESS SUBSTANCE (non-poisonous) ingestion    " Negative: Feces ingestion (e.g., patient with profound dementia or mental illness)    Negative: Patient wants to be seen    Negative: DOUBLE DOSE (an extra dose or lesser amount) of prescription drug and NO symptoms  (Exception: Double dose of antibiotic can be handled with reassurance and self-care at home disposition if asymptomatic.)    Negative: Diabetes drug error or overdose (e.g., insulin or extra dose)    Negative: HARMFUL SUBSTANCE or ACID or ALKALI ingestion (e.g., toilet , drain , lye, Clinitest tablets, ammonia, bleaches) AND NO symptoms    Negative: PETROLEUM PRODUCT ingestion (e.g., kerosene, gasoline, benzene, furniture polish, lighter fluid) AND NO symptoms    Negative: Carbon monoxide exposure suspected    Negative: MORE THAN A DOUBLE DOSE of a prescription or over-the-counter (OTC) drug    Negative: DOUBLE DOSE (an extra dose or lesser amount) of prescription drug and any symptoms (e.g., dizziness, nausea, pain, sleepiness)    Negative: DOUBLE DOSE (an extra dose or lesser amount) of over-the-counter (OTC) drug and any symptoms (e.g., dizziness, nausea, pain, sleepiness)    Negative: Took another person's prescription drug    Negative: Mercury spill (e.g., broken glass thermometer, broken spiral CFL lightbulb)    Negative: Wild mushroom ingestion, questions about    Negative: All OTHER POTENTIALLY HARMFUL SUBSTANCES (e.g., nearly all chemicals, plants, more than a double dose of a drug, took someone else's medicine)  (Exception: Known harmless substances and asymptomatic double dose of OTC drug.)    Negative: Triager unable to answer question    Negative: Patient or caller provides unclear information about type or amount of substance    Negative: HARMFUL SUBSTANCE or ACID or ALKALI ingestion (e.g., toilet , drain , lye, Clinitest tablets, ammonia, bleaches) AND any symptoms (e.g., mouth pain, sore throat, breathing difficulty)    Negative: PETROLEUM PRODUCT  ingestion (e.g., kerosene, gasoline, benzene, furniture polish, lighter fluid) AND any symptoms (e.g., breathing difficulty, coughing, vomiting)    Negative: [facility] advised caller to go to ED and caller seeking second opinion    Negative: Patient sounds very sick or weak to the triager    Negative: Chemical in eye    Negative: SEVERE difficulty breathing (e.g., struggling for each breath, speaks in single words)    Negative: Bluish (or gray) lips or face    Negative: Seizure    Negative: Difficult to awaken or acting confused (e.g., disoriented, slurred speech)    Negative: Shock suspected (e.g., cold/pale/clammy skin, too weak to stand, low BP, rapid pulse)    Negative: Intentional overdose and suicidal thoughts or ideas    Negative: Suicide attempt, known or suspected    Negative: Sounds like a life-threatening emergency to the triager    Negative: [1] Back pain AND [2] NOT recently diagnosed with a kidney stone    Negative: [1] Flank pain (i.e., pain in the side, over the lower ribs or just below the ribs) AND [2] NOT recently diagnosed with a kidney stone    Negative: [1] Unable to urinate (or only a few drops) > 4 hours AND [2] bladder feels very full (e.g., palpable bladder or strong urge to urinate)    Negative: [1] SEVERE pain (e.g., excruciating, scale 8-10) AND [2] not improved after pain medicine    Negative: SEVERE vomiting    Negative: Fever > 103 F (39.4 C)    Negative: Severe chills (i.e., feeling extremely cold WITH shaking chills)    Negative: Patient sounds very sick or weak to the triager    Protocols used: POISONING-A-[place], KIDNEY STONE FOLLOW-UP CALL-A-

## 2023-04-19 NOTE — ED NOTES
Pt discharging to home/ self-care. VSS upon discharge. Urine strainer provided. AVS reviewed and education given to patient.

## 2023-04-19 NOTE — DISCHARGE INSTRUCTIONS
I spoke with the director of the kidney stone [place].  We will contact you for prompt follow-up  Take your medications ahead of time  If your pain is unresponsive to the oral medication return to the emergency department  Your lab tests were normal

## 2023-04-19 NOTE — ED TRIAGE NOTES
Pt presents to ED with c/o right flank pain starting around 1800 last night.  Reports hx of kidney stones.  No medications taken PTA.  Reports urge to urinate when pain is strong.      Triage Assessment     Row Name 04/19/23 0149       Triage Assessment (Adult)    Airway WDL WDL       Respiratory WDL    Respiratory WDL WDL       Skin Circulation/Temperature WDL    Skin Circulation/Temperature WDL WDL       Cardiac WDL    Cardiac WDL WDL       Peripheral/Neurovascular WDL    Peripheral Neurovascular WDL WDL       Cognitive/Neuro/Behavioral WDL    Cognitive/Neuro/Behavioral WDL WDL

## 2023-04-19 NOTE — ED PROVIDER NOTES
EMERGENCY DEPARTMENT ENCOUNTER            IMPRESSION:  Urolithiasis        MEDICAL DECISION MAKING:  It was my pleasure to provide care for Adalberto Antonio who presented for evaluation of flank pain secondary to urolithiasis.    On my exam patient is pleasant and cooperative.  He demonstrate evidence of distress.  Vital signs show elevated blood pressure otherwise normal.  Physical exam notable for no abdominal or flank tenderness.     Pain medication administered for symptom relief.  Patient's symptoms improved.     Significant laboratory findings include slightly elevated white blood cell count otherwise kidney function normal    No imaging     ED evaluation is consistent with ureteral colic.    I contacted Dr. Law for follow-up    Patient was reevaluated and results were discussed.  I recommended patient take his medication on a regular basis      Prior to making a final disposition on this patient the results of patient's tests and other diagnostic studies were discussed with the patient. All questions were answered. Patient expressed understanding of the plan and was amenable.   Return precautions and follow-up were discussed.     =================================================================  CHIEF COMPLAINT:  Chief Complaint   Patient presents with     Flank Pain       HPI  Adalberto Antonio is a 43 year old male with a history of kidney stones who presents to the ED for evaluation of flank pain.    The patient reports that he was here last night for right flank pain and diagnosed with a kidney stone, but since then he has been having  worsening right flank pain after urinating around 12:30 PM today (4/19). He had tried taking the tylenol and other medications he was prescribed but still has significant pain.      Per patient significant other, the patient had taken toradol but he has still has been in pain. No other complaints at this time.    REVIEW OF SYSTEMS   Constitutional: Does not report  chills, unintentional weight loss or fatigue   Eyes: Does not report visual changes or discharge    HENT: Does not report sore throat, ear pain or neck pain  Respiratory: Does not report cough or shortness of breath    Cardiovascular: Does not report chest pain, palpitations or leg swelling  GI: Does not report abdominal pain, nausea, vomiting, or dark, bloody stools.    : Does not report hematuria, dysuria,. Endorses right flank pain (worsening).  Musculoskeletal: Does not report any new musculoskeletal pain or new muscle/joint pains  Skin: Does not report rash or wound  Neurologic: Does not report current headache, new weakness, focal weakness, or sensory changes        Remainder of systems reviewed, unless noted in HPI all others negative.      PAST MEDICAL HISTORY:  Past Medical History:   Diagnosis Date     Nephrolithiasis        PAST SURGICAL HISTORY:  Past Surgical History:   Procedure Laterality Date     LITHOTRIPSY           CURRENT MEDICATIONS:    acetaminophen (TYLENOL) 500 MG tablet  albuterol (PROAIR HFA/PROVENTIL HFA/VENTOLIN HFA) 108 (90 Base) MCG/ACT inhaler  albuterol (PROAIR HFA/PROVENTIL HFA/VENTOLIN HFA) 108 (90 Base) MCG/ACT inhaler  dextromethorphan-guaiFENesin (MUCINEX DM)  MG 12 hr tablet  dimenhyDRINATE (DRAMAMINE) 50 MG tablet  famotidine (PEPCID) 20 MG tablet  famotidine (PEPCID) 20 MG tablet  ibuprofen (ADVIL/MOTRIN) 200 MG tablet  ibuprofen (ADVIL/MOTRIN) 200 MG tablet  ondansetron (ZOFRAN ODT) 8 MG ODT tab  oxyCODONE (ROXICODONE) 5 MG tablet        ALLERGIES:  No Known Allergies    FAMILY HISTORY:  Family History   Problem Relation Age of Onset     No Known Problems Mother      Kidney Cancer Father      Heart Failure Maternal Grandmother      Dementia Maternal Grandfather      Cancer Paternal Grandmother        SOCIAL HISTORY:   Social History     Socioeconomic History     Marital status:    Tobacco Use     Smoking status: Never     Smokeless tobacco: Never   Substance  "and Sexual Activity     Alcohol use: Yes     Comment: Alcoholic Drinks/day: rare     Drug use: No     Sexual activity: Yes     Partners: Female     Comment:        PHYSICAL EXAM:    BP (!) 165/93   Pulse 74   Temp 97.6  F (36.4  C) (Temporal)   Resp 16   Ht 1.88 m (6' 2\")   Wt 118.9 kg (262 lb 1.6 oz)   SpO2 99%   BMI 33.65 kg/m      Constitutional: Awake, alert, some evidence of distress  Head: Normocephalic, atraumatic.  ENT: Mucous membranes are moist.  No pallor.   Eyes: Pupils are reactive.  No discoloration.  Neck: No lymphadenopathy, no stridor, supple, no soft tissue swelling  Chest: No tenderness   Respiratory: Respirations even, unlabored. Lungs clear to ascultation bilaterally, in no acute respiratory distress.  Cardiovascular: Regular rate and rhythm.  Good overall perfusion.  Upper and lower extremity pulses are equal.  GI: Abdomen soft, non-tender to palpation.  No guarding or rebound. Bowel sounds present throughout.   Back: No CVA tenderness.    Musculoskeletal: Moves all 4 extremities equally, full function and capacity no peripheral edema.   Integument: Warm, dry. No rash. No bruising or petechiae.  Neurologic: Alert & oriented x 3. Normal speech. Grossly normal motor and sensory function. No focal deficits noted.  NIHSS = 0  Psychiatric: Normal mood and affect.  Appropriate judgement.    ED COURSE:  3:23 PM I met with the patient, obtained history, performed an initial exam, and discussed options and plan for diagnostics and treatment here in the ED.      Medical Decision Making    History:    Supplemental history from: Family, nurse triage note.    External Record(s) reviewed: External medical records including care everywhere reviewed    Work Up:    EKG, laboratory and imaging studies as ordered were independently reviewed by the provider    Broad differential diagnosis considered for abdominal pain    The patient's presentation was of moderate complexity.     External " consultation:    Discussion of management with another provider: Urology    Complicating factors:    Patient has a complicated past medical history including recent kidney stone    Care affected by social determinants of health: Access to primary care    Disposition involved shared decision-making with the patient.  The patient's management necessitated high risk regarding consideration for hospitalization     Admission was considered however patient was able to be eventually controlled..    Prescription medication  .  Patient otherwise to continue outpatient medications as prescribed.       LAB:  Laboratory results were independently reviewed and interpreted  Results for orders placed or performed during the hospital encounter of 04/19/23   Comprehensive metabolic panel   Result Value Ref Range    Sodium 139 136 - 145 mmol/L    Potassium 4.3 3.5 - 5.0 mmol/L    Chloride 107 98 - 107 mmol/L    Carbon Dioxide (CO2) 24 22 - 31 mmol/L    Anion Gap 8 5 - 18 mmol/L    Urea Nitrogen 14 8 - 22 mg/dL    Creatinine 1.28 0.70 - 1.30 mg/dL    Calcium 9.5 8.5 - 10.5 mg/dL    Glucose 122 70 - 125 mg/dL    Alkaline Phosphatase 74 45 - 120 U/L    AST 32 0 - 40 U/L    ALT 57 (H) 0 - 45 U/L    Protein Total 7.6 6.0 - 8.0 g/dL    Albumin 4.5 3.5 - 5.0 g/dL    Bilirubin Total 0.9 0.0 - 1.0 mg/dL    GFR Estimate 71 >60 mL/min/1.73m2   Result Value Ref Range    Lipase 39 0 - 52 U/L   UA with Microscopic reflex to Culture    Specimen: Urine, Catheter   Result Value Ref Range    Color Urine Light Yellow Colorless, Straw, Light Yellow, Yellow    Appearance Urine Clear Clear    Glucose Urine Negative Negative mg/dL    Bilirubin Urine Negative Negative    Ketones Urine Trace (A) Negative mg/dL    Specific Gravity Urine 1.033 (H) 1.001 - 1.030    Blood Urine Negative Negative    pH Urine 7.0 5.0 - 7.0    Protein Albumin Urine 30 (A) Negative mg/dL    Urobilinogen Urine <2.0 <2.0 mg/dL    Nitrite Urine Negative Negative    Leukocyte Esterase  Urine Negative Negative    Budding Yeast Urine Few (A) None Seen /HPF    Mucus Urine Present (A) None Seen /LPF    RBC Urine 2 <=2 /HPF    WBC Urine 4 <=5 /HPF   CBC with platelets and differential   Result Value Ref Range    WBC Count 12.1 (H) 4.0 - 11.0 10e3/uL    RBC Count 5.18 4.40 - 5.90 10e6/uL    Hemoglobin 14.8 13.3 - 17.7 g/dL    Hematocrit 44.3 40.0 - 53.0 %    MCV 86 78 - 100 fL    MCH 28.6 26.5 - 33.0 pg    MCHC 33.4 31.5 - 36.5 g/dL    RDW 13.4 10.0 - 15.0 %    Platelet Count 297 150 - 450 10e3/uL    % Neutrophils 77 %    % Lymphocytes 14 %    % Monocytes 6 %    % Eosinophils 1 %    % Basophils 1 %    % Immature Granulocytes 1 %    NRBCs per 100 WBC 0 <1 /100    Absolute Neutrophils 9.5 (H) 1.6 - 8.3 10e3/uL    Absolute Lymphocytes 1.6 0.8 - 5.3 10e3/uL    Absolute Monocytes 0.7 0.0 - 1.3 10e3/uL    Absolute Eosinophils 0.1 0.0 - 0.7 10e3/uL    Absolute Basophils 0.1 0.0 - 0.2 10e3/uL    Absolute Immature Granulocytes 0.1 <=0.4 10e3/uL    Absolute NRBCs 0.0 10e3/uL               MEDICATIONS GIVEN IN THE EMERGENCY:  Medications   ketorolac (TORADOL) injection 15 mg (15 mg Intravenous $Given 4/19/23 1400)   0.9% sodium chloride BOLUS (0 mLs Intravenous Stopped 4/19/23 1602)   oxyCODONE (ROXICODONE) tablet 5 mg (5 mg Oral $Given 4/19/23 1400)           NEW PRESCRIPTIONS STARTED AT TODAY'S ER VISIT:  Discharge Medication List as of 4/19/2023  5:35 PM                   FINAL DIAGNOSIS:    ICD-10-CM    1. Kidney stone  N20.0 Adult Urology Referral                 NAME: Adalberto Antonio  AGE: 43 year old male  YOB: 1980  MRN: [id number]  EVALUATION DATE & TIME: 4/19/2023  3:51 PM    PCP: Tanisha Goyal    ED PROVIDER: GUMARO Mclean M.D.  Emergency Medicine  [facility] EMERGENCY ROOM  [address] [facility] [id number] [phone]  Dept: [phone]  4/19/2023       Pankaj Drake MD  04/19/23 9045

## 2023-04-19 NOTE — ED TRIAGE NOTES
Arrives to ED with c/o worsening pain to R flank. Seen in ED at 0200 and dx with kidney stone. Took ibuprofen 800mg at 0900. Accidentally took 2000mg tylenol, oxycodone and dramamine at 1130. Pain worsening after urinating at 1230.      Triage Assessment     Row Name 04/19/23 3186       Triage Assessment (Adult)    Airway WDL WDL       Respiratory WDL    Respiratory WDL WDL       Skin Circulation/Temperature WDL    Skin Circulation/Temperature WDL WDL       Cardiac WDL    Cardiac WDL X  HTN       Peripheral/Neurovascular WDL    Peripheral Neurovascular WDL WDL       Cognitive/Neuro/Behavioral WDL    Cognitive/Neuro/Behavioral WDL WDL

## 2023-05-03 ENCOUNTER — VIRTUAL VISIT (OUTPATIENT)
Dept: UROLOGY | Facility: CLINIC | Age: 43
End: 2023-05-03
Payer: COMMERCIAL

## 2023-05-03 VITALS — BODY MASS INDEX: 32.73 KG/M2 | WEIGHT: 255 LBS | HEIGHT: 74 IN

## 2023-05-03 DIAGNOSIS — N20.0 KIDNEY STONE: ICD-10-CM

## 2023-05-03 PROCEDURE — 99202 OFFICE O/P NEW SF 15 MIN: CPT | Mod: VID | Performed by: UROLOGY

## 2023-05-03 ASSESSMENT — PAIN SCALES - GENERAL: PAINLEVEL: NO PAIN (0)

## 2023-05-03 NOTE — PROGRESS NOTES
**TEXT LINK TO CELL, [phone]**      Adalberto is a 43 year old who is being evaluated via a billable video visit.      How would you like to obtain your AVS? My[employer]     If the video visit is dropped, the invitation should be resent by: Text to cell phone: [phone]    Will anyone else be joining your video visit? [facility]  Main Office: [address]  [address]  [place] [address]       CHIEF COMPLAINT:  Right distal ureteral stone    HISTORY:   This is a 43-year-old gentleman who presented to the emergency department at [facility] 2 weeks ago with right flank pain.  He aas diagnosed with a small distal right ureteral stone.  He says he passed the stone later that day and caught it in the strainer.  He has been asymptomatic since that time.  He had no other stones on his CT scan.      PAST MEDICAL HISTORY:   Past Medical History:   Diagnosis Date     Nephrolithiasis        PAST SURGICAL HISTORY:   Past Surgical History:   Procedure Laterality Date     LITHOTRIPSY         FAMILY HISTORY:   Family History   Problem Relation Age of Onset     No Known Problems Mother      Kidney Cancer Father      Heart Failure Maternal Grandmother      Dementia Maternal Grandfather      Cancer Paternal Grandmother        SOCIAL HISTORY:   Social History     Tobacco Use     Smoking status: Never     Smokeless tobacco: Never   Vaping Use     Vaping status: Not on file   Substance Use Topics     Alcohol use: Yes     Comment: Alcoholic Drinks/day: rare        No Known Allergies      Current Outpatient Medications:      albuterol (PROAIR HFA/PROVENTIL HFA/VENTOLIN HFA) 108 (90 Base) MCG/ACT inhaler, Inhale 2 puffs into the lungs every 6 hours as needed for shortness of breath / dyspnea or wheezing, Disp: 18 g, Rfl: 0     famotidine (PEPCID) 20 MG tablet, Take 20 mg by mouth, Disp: , Rfl:      albuterol (PROAIR HFA/PROVENTIL HFA/VENTOLIN HFA) 108 (90 Base) MCG/ACT inhaler, Inhale 2 puffs into the  lungs every 6 hours as needed for shortness of breath / dyspnea or wheezing (Patient not taking: Reported on 10/18/2022), Disp: 18 g, Rfl: 0     dextromethorphan-guaiFENesin (MUCINEX DM)  MG 12 hr tablet, Take 1 tablet by mouth every 12 hours (Patient not taking: Reported on 10/18/2022), Disp: , Rfl:      famotidine (PEPCID) 20 MG tablet, [FAMOTIDINE (PEPCID) 20 MG TABLET] Take 20 mg by mouth 2 (two) times a day. (Patient not taking: Reported on 10/18/2022), Disp: , Rfl:      ibuprofen (ADVIL/MOTRIN) 200 MG tablet, Take 800 mg by mouth (Patient not taking: Reported on 10/18/2022), Disp: , Rfl:      ondansetron (ZOFRAN ODT) 8 MG ODT tab, Take 1 tablet (8 mg) by mouth every 8 hours as needed for nausea, Disp: 12 tablet, Rfl: 0    Review Of Systems:  Skin: No rash, pruritis, or skin pigmentation  Eyes: No changes in vision  Ears/Nose/Throat: No changes in hearing, no nosebleeds  Respiratory: No shortness of breath, dyspnea on exertion, cough, or hemoptysis  Cardiovascular: No chest pain or palpitations  Gastrointestinal: No diarrhea or constipation. No abdominal pain. No hematochezia  Genitourinary: see HPI  Musculoskeletal: No pain or swelling of joints, normal range of motion  Neurologic: No weakness or tremors  Psychiatric: No recent changes in memory or mood  Hematologic/Lymphatic/Immunologic: No easy bruising or enlarged lymph nodes  Endocrine: No weight gain or loss      PHYSICAL EXAM:    General: Alert and oriented to time, place, and self. In NAD   HEENT: Head AT/NC, EOMI, CN Grossly intact   Lungs: no respiratory distress, or pursed lip breathing   Heart: No obvious jugular venous distension present   Musculoskeltal: Normal movements. Normal appearing musculature  Skin: no suspicious lesions or rashes   Neuro: Alert, oriented, speech and mentation normal; moving all 4 extremities equally.   Psych: affect and mood normal      PSA:     UA RESULTS:  Recent Labs   Lab Test 04/19/23  1359   COLOR Light Yellow    APPEARANCE Clear   URINEGLC Negative   URINEBILI Negative   URINEKETONE Trace*   SG 1.033*   UBLD Negative   URINEPH 7.0   PROTEIN 30*   NITRITE Negative   LEUKEST Negative   RBCU 2   WBCU 4       Bladder Scan:     Other Labs:      Imaging Studies: I personally reviewed his CT scan images.  Tiny distal right ureteral stone.  No other stones identified.      CLINICAL IMPRESSION:   Right ureteral stone, now passed    PLAN:   He had a small distal radial stone that has now passed.  No other stones are present.  He will follow-up with urology as needed.      Jovi Mccullough MD      Video-Visit Details    Type of service:  Video Visit     Originating Location (pt. Location): Home    Distant Location (provider location):  On-site  Platform used for Video Visit: Sparkbrowser

## 2023-05-03 NOTE — LETTER
5/3/2023       RE: Adalberto Antonio  [id number] [facility] [address]     Dear Colleague,    Thank you for referring your patient, Adalberto Antonio, to the [facility] [place] at [facility]. Please see a copy of my visit note below.    **TEXT LINK TO CELL, [phone]**      Adalberto is a 43 year old who is being evaluated via a billable video visit.      How would you like to obtain your AVS? MyChart     If the video visit is dropped, the invitation should be resent by: Text to cell phone: [phone]    Will anyone else be joining your video visit? No           [facility]  Main Office: [address]  [address]  [place] [address]       CHIEF COMPLAINT:  Right distal ureteral stone    HISTORY:   This is a 43-year-old gentleman who presented to the emergency department at [facility] 2 weeks ago with right flank pain.  He aas diagnosed with a small distal right ureteral stone.  He says he passed the stone later that day and caught it in the strainer.  He has been asymptomatic since that time.  He had no other stones on his CT scan.      PAST MEDICAL HISTORY:   Past Medical History:   Diagnosis Date    Nephrolithiasis        PAST SURGICAL HISTORY:   Past Surgical History:   Procedure Laterality Date    LITHOTRIPSY         FAMILY HISTORY:   Family History   Problem Relation Age of Onset    No Known Problems Mother     Kidney Cancer Father     Heart Failure Maternal Grandmother     Dementia Maternal Grandfather     Cancer Paternal Grandmother        SOCIAL HISTORY:   Social History     Tobacco Use    Smoking status: Never    Smokeless tobacco: Never   Vaping Use    Vaping status: Not on file   Substance Use Topics    Alcohol use: Yes     Comment: Alcoholic Drinks/day: rare        No Known Allergies      Current Outpatient Medications:     albuterol (PROAIR HFA/PROVENTIL HFA/VENTOLIN HFA) 108 (90 Base) MCG/ACT inhaler, Inhale 2  puffs into the lungs every 6 hours as needed for shortness of breath / dyspnea or wheezing, Disp: 18 g, Rfl: 0    famotidine (PEPCID) 20 MG tablet, Take 20 mg by mouth, Disp: , Rfl:     albuterol (PROAIR HFA/PROVENTIL HFA/VENTOLIN HFA) 108 (90 Base) MCG/ACT inhaler, Inhale 2 puffs into the lungs every 6 hours as needed for shortness of breath / dyspnea or wheezing (Patient not taking: Reported on 10/18/2022), Disp: 18 g, Rfl: 0    dextromethorphan-guaiFENesin (MUCINEX DM)  MG 12 hr tablet, Take 1 tablet by mouth every 12 hours (Patient not taking: Reported on 10/18/2022), Disp: , Rfl:     famotidine (PEPCID) 20 MG tablet, [FAMOTIDINE (PEPCID) 20 MG TABLET] Take 20 mg by mouth 2 (two) times a day. (Patient not taking: Reported on 10/18/2022), Disp: , Rfl:     ibuprofen (ADVIL/MOTRIN) 200 MG tablet, Take 800 mg by mouth (Patient not taking: Reported on 10/18/2022), Disp: , Rfl:     ondansetron (ZOFRAN ODT) 8 MG ODT tab, Take 1 tablet (8 mg) by mouth every 8 hours as needed for nausea, Disp: 12 tablet, Rfl: 0    Review Of Systems:  Skin: No rash, pruritis, or skin pigmentation  Eyes: No changes in vision  Ears/Nose/Throat: No changes in hearing, no nosebleeds  Respiratory: No shortness of breath, dyspnea on exertion, cough, or hemoptysis  Cardiovascular: No chest pain or palpitations  Gastrointestinal: No diarrhea or constipation. No abdominal pain. No hematochezia  Genitourinary: see HPI  Musculoskeletal: No pain or swelling of joints, normal range of motion  Neurologic: No weakness or tremors  Psychiatric: No recent changes in memory or mood  Hematologic/Lymphatic/Immunologic: No easy bruising or enlarged lymph nodes  Endocrine: No weight gain or loss      PHYSICAL EXAM:    General: Alert and oriented to time, place, and self. In NAD   HEENT: Head AT/NC, EOMI, CN Grossly intact   Lungs: no respiratory distress, or pursed lip breathing   Heart: No obvious jugular venous distension present   Musculoskeltal:  Normal movements. Normal appearing musculature  Skin: no suspicious lesions or rashes   Neuro: Alert, oriented, speech and mentation normal; moving all 4 extremities equally.   Psych: affect and mood normal      PSA:     UA RESULTS:  Recent Labs   Lab Test 04/19/23  1359   COLOR Light Yellow   APPEARANCE Clear   URINEGLC Negative   URINEBILI Negative   URINEKETONE Trace*   SG 1.033*   UBLD Negative   URINEPH 7.0   PROTEIN 30*   NITRITE Negative   LEUKEST Negative   RBCU 2   WBCU 4       Bladder Scan:     Other Labs:      Imaging Studies: I personally reviewed his CT scan images.  Tiny distal right ureteral stone.  No other stones identified.      CLINICAL IMPRESSION:   Right ureteral stone, now passed    PLAN:   He had a small distal radial stone that has now passed.  No other stones are present.  He will follow-up with urology as needed.      Jovi Mccullough MD      Video-Visit Details    Type of service:  Video Visit     Originating Location (pt. Location): Home    Distant Location (provider location):  On-site  Platform used for Video Visit: [facility]

## 2023-05-03 NOTE — NURSING NOTE
Chief Complaint   Patient presents with     Ureteral stone     Pt passed stone and was able to catch it.      Traci Olivera, EMT

## 2023-10-08 ENCOUNTER — HEALTH MAINTENANCE LETTER (OUTPATIENT)
Age: 43
End: 2023-10-08

## 2023-10-30 ENCOUNTER — OFFICE VISIT (OUTPATIENT)
Dept: FAMILY MEDICINE | Facility: CLINIC | Age: 43
End: 2023-10-30
Payer: COMMERCIAL

## 2023-10-30 VITALS
RESPIRATION RATE: 18 BRPM | SYSTOLIC BLOOD PRESSURE: 135 MMHG | DIASTOLIC BLOOD PRESSURE: 84 MMHG | WEIGHT: 260 LBS | TEMPERATURE: 98 F | BODY MASS INDEX: 33.38 KG/M2 | HEART RATE: 74 BPM | OXYGEN SATURATION: 98 %

## 2023-10-30 DIAGNOSIS — J20.9 ACUTE BRONCHITIS, UNSPECIFIED ORGANISM: Primary | ICD-10-CM

## 2023-10-30 PROCEDURE — 99213 OFFICE O/P EST LOW 20 MIN: CPT | Performed by: PHYSICIAN ASSISTANT

## 2023-10-30 RX ORDER — BUDESONIDE AND FORMOTEROL FUMARATE DIHYDRATE 160; 4.5 UG/1; UG/1
2 AEROSOL RESPIRATORY (INHALATION)
Qty: 11 G | Refills: 0 | Status: SHIPPED | OUTPATIENT
Start: 2023-10-30 | End: 2023-11-27

## 2023-10-30 NOTE — PROGRESS NOTES
Chief Complaint   Patient presents with    Cough     X1 month         ASSESSMENT/PLAN:  Adalberto was seen today for cough.    Diagnoses and all orders for this visit:    Acute bronchitis, unspecified organism  -     budesonide-formoterol (SYMBICORT) 160-4.5 MCG/ACT Inhaler; Inhale 2 puffs into the lungs two times daily for 14 days    No evidence of pneumonia.  Reassuring exam and vitals.  Bronchitis versus postviral cough syndrome.  Start on Symbicort and follow-up with PCP if not improving in 2 to 4 weeks    Dawit Ely PA-C      SUBJECTIVE:  Adalberto is a 43 year old male who presents to urgent care with 1 month of a cough.  Has some chest tightness mainly in the morning.  It is dry.  Feels otherwise well.  Does not report any cold-like symptoms originally.    ROS: Pertinent ROS neg other than the symptoms noted above in the HPI.     OBJECTIVE:  /84 (BP Location: Right arm, Patient Position: Sitting, Cuff Size: Adult Large)   Pulse 74   Temp 98  F (36.7  C) (Oral)   Resp 18   Wt 117.9 kg (260 lb)   SpO2 98%   BMI 33.38 kg/m     GENERAL: healthy, alert and no distress  EYES: Eyes grossly normal to inspection, PERRL and conjunctivae and sclerae normal  HENT: ear canals and TM's normal, nose and mouth without ulcers or lesions  RESP: lungs clear to auscultation - no rales, rhonchi or wheezes  CV: regular rate and rhythm, normal S1 S2, no S3 or S4, no murmur, click or rub,    DIAGNOSTICS    No results found for any visits on 10/30/23.     Current Outpatient Medications   Medication    albuterol (PROAIR HFA/PROVENTIL HFA/VENTOLIN HFA) 108 (90 Base) MCG/ACT inhaler    famotidine (PEPCID) 20 MG tablet    albuterol (PROAIR HFA/PROVENTIL HFA/VENTOLIN HFA) 108 (90 Base) MCG/ACT inhaler    famotidine (PEPCID) 20 MG tablet    ibuprofen (ADVIL/MOTRIN) 200 MG tablet     No current facility-administered medications for this visit.      Patient Active Problem List   Diagnosis    COVID-19 virus infection      Past  Medical History:   Diagnosis Date    Nephrolithiasis      Past Surgical History:   Procedure Laterality Date    LITHOTRIPSY       Family History   Problem Relation Age of Onset    No Known Problems Mother     Kidney Cancer Father     Heart Failure Maternal Grandmother     Dementia Maternal Grandfather     Cancer Paternal Grandmother      Social History     Tobacco Use    Smoking status: Never    Smokeless tobacco: Never   Substance Use Topics    Alcohol use: Yes     Comment: Alcoholic Drinks/day: rare              The plan of care was discussed with the patient. They understand and agree with the course of treatment prescribed. A printed summary was given including instructions and medications.  The use of [email or web address] dictation services may have been used to construct the content in this note; any grammatical or spelling errors are non-intentional. Please contact the author of this note directly if you are in need of any clarification.

## 2023-11-22 ENCOUNTER — OFFICE VISIT (OUTPATIENT)
Dept: FAMILY MEDICINE | Facility: CLINIC | Age: 43
End: 2023-11-22
Payer: COMMERCIAL

## 2023-11-22 VITALS
TEMPERATURE: 98 F | OXYGEN SATURATION: 96 % | DIASTOLIC BLOOD PRESSURE: 83 MMHG | HEART RATE: 82 BPM | SYSTOLIC BLOOD PRESSURE: 134 MMHG | RESPIRATION RATE: 18 BRPM

## 2023-11-22 DIAGNOSIS — R05.2 SUBACUTE COUGH: ICD-10-CM

## 2023-11-22 DIAGNOSIS — H66.91 ACUTE EAR INFECTION, RIGHT: Primary | ICD-10-CM

## 2023-11-22 DIAGNOSIS — J01.90 ACUTE SINUSITIS WITH SYMPTOMS > 10 DAYS: ICD-10-CM

## 2023-11-22 PROCEDURE — 99213 OFFICE O/P EST LOW 20 MIN: CPT | Performed by: FAMILY MEDICINE

## 2023-11-22 RX ORDER — PREDNISONE 20 MG/1
TABLET ORAL
Qty: 15 TABLET | Refills: 0 | Status: SHIPPED | OUTPATIENT
Start: 2023-11-22 | End: 2023-12-06

## 2023-11-23 NOTE — PROGRESS NOTES
Assessment/Plan:   1. Acute ear infection, right  2. Acute sinusitis with symptoms > 10 days  - amoxicillin-clavulanate (AUGMENTIN) 875-125 MG tablet; Take 1 tablet by mouth 2 times daily for 10 days  Dispense: 20 tablet; Refill: 0  3. Subacute cough  - predniSONE (DELTASONE) 20 MG tablet; 40mg daily for 5 days then 20mg daily for 5 days  Dispense: 15 tablet; Refill: 0    Prolonged upper respiratory infection with sinus congestion and cough since 9/27/2023.  Now with an acute right otitis media and probable secondary bacterial sinusitis.  Ongoing cough and chest tightness not responding to his albuterol inhaler.  We will treat with Augmentin and prednisone  Continue nasal saline steam humidifier and fluids.  Cough drops as needed.  Recheck if worse or no better.    I discussed red flag symptoms, return precautions to clinic/ER and follow up care with patient/parent.  Expected clinical course, symptomatic cares advised. Questions answered. Patient/parent amenable with plan.        Subjective:     Adalberto Antonio is a 43 year old male who presents for evaluation of cough congestion and bilateral ear pain.  He developed an upper respiratory infection on 9/27/2023.  His wife and kids had already developed similar symptoms.  His cough and congestion have persisted.    His inhaler is not helping very well with his chest tightness.  This week he has had increasing pain and  pressure in his ears right greater than left associated with decreased hearing.  Overall head congestion is worse.   Cough is productive at times. May be due to post nasal drainage.   No recent fever.     No Known Allergies  Current Outpatient Medications   Medication    amoxicillin-clavulanate (AUGMENTIN) 875-125 MG tablet    predniSONE (DELTASONE) 20 MG tablet    albuterol (PROAIR HFA/PROVENTIL HFA/VENTOLIN HFA) 108 (90 Base) MCG/ACT inhaler    albuterol (PROAIR HFA/PROVENTIL HFA/VENTOLIN HFA) 108 (90 Base) MCG/ACT inhaler    budesonide-formoterol  (SYMBICORT) 160-4.5 MCG/ACT Inhaler    famotidine (PEPCID) 20 MG tablet    famotidine (PEPCID) 20 MG tablet    ibuprofen (ADVIL/MOTRIN) 200 MG tablet     No current facility-administered medications for this visit.     Patient Active Problem List   Diagnosis    COVID-19 virus infection       Objective:     /83   Pulse 82   Temp 98  F (36.7  C) (Oral)   Resp 18   SpO2 96%     Physical    General Appearance: Alert, pleasant, no distress, aVSS  Head: Normocephalic, without obvious abnormality, atraumatic  Eyes: Conjunctivae are normal.   Ears: Right TM dull red and bulging with loss of landmarks,  with normal canal. The left ear appears normal.   Nose: congestion.  Red swollen turbinates bilaterally with purulent crusting.  No sinus discomfort with percussion over the maxillary  Throat: Throat is normal.  No exudate.  No vesicular lesions  Neck: No adenopathy  Lungs: Clear to auscultation bilaterally, respirations unlabored  Heart: Regular rate and rhythm  Skin:  no rashes or lesions  Psychiatric: Patient has a normal mood and affect.           This note has been dictated in part using voice recognition software.  Any grammatical or context distortions are unintentional and inherent to the software.  Please feel free to contact me directly for clarification if needed.

## 2023-11-27 DIAGNOSIS — J20.9 ACUTE BRONCHITIS, UNSPECIFIED ORGANISM: ICD-10-CM

## 2023-11-27 RX ORDER — BUDESONIDE AND FORMOTEROL FUMARATE DIHYDRATE 160; 4.5 UG/1; UG/1
2 AEROSOL RESPIRATORY (INHALATION)
Qty: 10.2 G | Refills: 1 | Status: SHIPPED | OUTPATIENT
Start: 2023-11-27

## 2023-12-06 ENCOUNTER — OFFICE VISIT (OUTPATIENT)
Dept: FAMILY MEDICINE | Facility: CLINIC | Age: 43
End: 2023-12-06
Payer: COMMERCIAL

## 2023-12-06 ENCOUNTER — ANCILLARY PROCEDURE (OUTPATIENT)
Dept: GENERAL RADIOLOGY | Facility: CLINIC | Age: 43
End: 2023-12-06
Attending: FAMILY MEDICINE
Payer: COMMERCIAL

## 2023-12-06 VITALS
BODY MASS INDEX: 33.64 KG/M2 | SYSTOLIC BLOOD PRESSURE: 134 MMHG | RESPIRATION RATE: 18 BRPM | WEIGHT: 262 LBS | HEART RATE: 87 BPM | TEMPERATURE: 97.1 F | DIASTOLIC BLOOD PRESSURE: 88 MMHG | OXYGEN SATURATION: 97 %

## 2023-12-06 DIAGNOSIS — J98.01 BRONCHOSPASM: ICD-10-CM

## 2023-12-06 DIAGNOSIS — R05.3 PERSISTENT DRY COUGH: ICD-10-CM

## 2023-12-06 DIAGNOSIS — R05.3 PERSISTENT DRY COUGH: Primary | ICD-10-CM

## 2023-12-06 PROCEDURE — 71046 X-RAY EXAM CHEST 2 VIEWS: CPT | Mod: TC | Performed by: RADIOLOGY

## 2023-12-06 PROCEDURE — 99214 OFFICE O/P EST MOD 30 MIN: CPT | Performed by: FAMILY MEDICINE

## 2023-12-06 RX ORDER — AZITHROMYCIN 250 MG/1
TABLET, FILM COATED ORAL
Qty: 6 TABLET | Refills: 0 | Status: SHIPPED | OUTPATIENT
Start: 2023-12-06 | End: 2023-12-11

## 2023-12-07 NOTE — PROGRESS NOTES
Assessment/Plan:   1. Persistent dry cough  - XR Chest 2 Views; Future  - azithromycin (ZITHROMAX) 250 MG tablet; Take 2 tablets (500 mg) by mouth daily for 1 day, THEN 1 tablet (250 mg) daily for 4 days.  Dispense: 6 tablet; Refill: 0    Persistent cough for about 6 weeks now.  Treated with prednisone for bronchitis, also had antibiotic for ear infection 2 weeks ago.  Cough has persisted.  Chest xray was obtained today and viewed by me.  No apparent focal infiltrate, pneumothorax or fluid.   Treat  for atypical bacteria with Azithromycin as directed (OR - may take two tabs daily for 3 days). It works in body for 10 days either way. Take with food. May get loose stools with first dose or two.   Continue symbicort twice daily  Albuterol inhaler every 4 hours as needed - try with the spacer, I think it will be more effective.   Continue cough drops  Increase pepcid to 40mg twice daily (total 80mg daily) for 2-4 weeks.   Don't lie down right after eating, limit caffeine and alcohol, avoid spicy or acidic foods including tomatoes.   I worry that reflux from stomach is triggering an angry larynx and causing ongoing cough. Prednisone can increase acid reflux as will the mechanical forces of coughing.     Recheck with primary care if not improving. May need pulmonary evaluation.     I discussed red flag symptoms, return precautions to clinic/ER and follow up care with patient/parent.  Expected clinical course, symptomatic cares advised. Questions answered. Patient/parent amenable with plan.    Subjective:     Adalberto Antonio is a 43 year old male who presents for evaluation of cough.  He was first diagnosed with a bronchitis at the end of October.  His cough persisted and he was seen here 2 weeks ago with ear infection and treated with an antibiotic.  His ear is better however he is still coughing.  The cough occurs in fits leading to headache and rib pain.  He gets cramps in between his ribs, even in between  coughing.  Cough is still dry.  Cough drops do help.  He has had no new fevers.  No definite wheezing or shortness of breath.  He has been using the Symbicort regularly.  He has tried the albuterol inhaler without a spacer which has not helped.  The prednisone did not seem to help with the cough.  He takes Pepcid 20 mg usually at bedtime if needed.    No Known Allergies  Current Outpatient Medications   Medication    albuterol (PROAIR HFA/PROVENTIL HFA/VENTOLIN HFA) 108 (90 Base) MCG/ACT inhaler    azithromycin (ZITHROMAX) 250 MG tablet    budesonide-formoterol (SYMBICORT) 160-4.5 MCG/ACT Inhaler    albuterol (PROAIR HFA/PROVENTIL HFA/VENTOLIN HFA) 108 (90 Base) MCG/ACT inhaler    famotidine (PEPCID) 20 MG tablet    ibuprofen (ADVIL/MOTRIN) 200 MG tablet     No current facility-administered medications for this visit.     Patient Active Problem List   Diagnosis    COVID-19 virus infection       Objective:     /88   Pulse 87   Temp 97.1  F (36.2  C) (Tympanic)   Resp 18   Wt 118.8 kg (262 lb)   SpO2 97%   BMI 33.64 kg/m      Physical    General Appearance: Alert, pleasant, no distress, AVSS  Head: Normocephalic, without obvious abnormality, atraumatic  Eyes: Conjunctivae are normal. Extraocular movements are intact. PERRLA  Ears: Normal TMs and external ear canals, both ears  Nose: No significant congestion.  Throat: Throat is normal.  No exudate.  No vesicular lesions  Neck: No adenopathy  Lungs: Clear to auscultation bilaterally, deep breaths trigger cough, respirations unlabored.  Mild right-sided chest wall pain with palpation.  Heart: Regular rate and rhythm  Abdomen: Soft, non-tender  Extremities: No lower extremity edema or calf pain  Skin: no rashes or lesions  Psychiatric: Patient has a normal mood and affect.         Results for orders placed or performed in visit on 12/06/23   XR Chest 2 Views     Status: None    Narrative    EXAM: XR CHEST 2 VIEWS  LOCATION: [facility]  [place]  DATE: 12/6/2023    INDICATION: cough since September  COMPARISON: 11/20/2021      Impression    IMPRESSION: Negative chest.       This note has been dictated in part using voice recognition software.  Any grammatical or context distortions are unintentional and inherent to the software.  Please feel free to contact me directly for clarification if needed.

## 2023-12-07 NOTE — PATIENT INSTRUCTIONS
Azithromycin as directed - OR - may take two tabs daily for 3 days. It works in body for 10 days either way. Take with food. May get loose stools with first dose or two.     Continue symbicort twice daily  Albuterol inhaler every 4 hours as needed - try with the spacer, I think it will be more effective.     Continue cough drops    Increase pepcid to twice daily (total 80mg daily) for 2-4 weeks.   Don't lie down right after eating, limit caffeine and alcohol, avoid spicy or acidic foods including tomatoes.   I worry that reflux from stomach is triggering an angry larynx and causing ongoing cough. Prednisone can increase acid reflux as will the mechanical forces of coughing.     Chest xray is normal.   Recheck with primary care if not improving. May need pulmonary evaluation.

## 2024-10-28 PROBLEM — U07.1 COVID-19 VIRUS INFECTION: Status: RESOLVED | Noted: 2021-05-05 | Resolved: 2024-10-28

## 2024-10-29 SDOH — HEALTH STABILITY: PHYSICAL HEALTH
ON AVERAGE, HOW MANY DAYS PER WEEK DO YOU ENGAGE IN MODERATE TO STRENUOUS EXERCISE (LIKE A BRISK WALK)?: PATIENT DECLINED

## 2024-10-29 SDOH — HEALTH STABILITY: PHYSICAL HEALTH: ON AVERAGE, HOW MANY MINUTES DO YOU ENGAGE IN EXERCISE AT THIS LEVEL?: PATIENT DECLINED

## 2024-10-29 ASSESSMENT — SOCIAL DETERMINANTS OF HEALTH (SDOH): HOW OFTEN DO YOU GET TOGETHER WITH FRIENDS OR RELATIVES?: ONCE A WEEK

## 2024-10-30 ENCOUNTER — OFFICE VISIT (OUTPATIENT)
Dept: FAMILY MEDICINE | Facility: CLINIC | Age: 44
End: 2024-10-30
Payer: COMMERCIAL

## 2024-10-30 VITALS
SYSTOLIC BLOOD PRESSURE: 122 MMHG | WEIGHT: 264 LBS | BODY MASS INDEX: 33.88 KG/M2 | DIASTOLIC BLOOD PRESSURE: 86 MMHG | RESPIRATION RATE: 18 BRPM | HEIGHT: 74 IN | TEMPERATURE: 97.1 F | HEART RATE: 77 BPM | OXYGEN SATURATION: 96 %

## 2024-10-30 DIAGNOSIS — Z13.1 DIABETES MELLITUS SCREENING: ICD-10-CM

## 2024-10-30 DIAGNOSIS — M25.561 CHRONIC PAIN OF BOTH KNEES: ICD-10-CM

## 2024-10-30 DIAGNOSIS — M79.642 BILATERAL HAND PAIN: ICD-10-CM

## 2024-10-30 DIAGNOSIS — Z00.00 ENCOUNTER FOR ROUTINE HISTORY AND PHYSICAL EXAM FOR MALE: Primary | ICD-10-CM

## 2024-10-30 DIAGNOSIS — Z13.220 LIPID SCREENING: ICD-10-CM

## 2024-10-30 DIAGNOSIS — R53.83 FATIGUE, UNSPECIFIED TYPE: ICD-10-CM

## 2024-10-30 DIAGNOSIS — M79.641 BILATERAL HAND PAIN: ICD-10-CM

## 2024-10-30 DIAGNOSIS — E66.811 CLASS 1 OBESITY WITHOUT SERIOUS COMORBIDITY WITH BODY MASS INDEX (BMI) OF 33.0 TO 33.9 IN ADULT, UNSPECIFIED OBESITY TYPE: ICD-10-CM

## 2024-10-30 DIAGNOSIS — M25.562 CHRONIC PAIN OF BOTH KNEES: ICD-10-CM

## 2024-10-30 DIAGNOSIS — G89.29 CHRONIC PAIN OF BOTH KNEES: ICD-10-CM

## 2024-10-30 PROBLEM — R73.03 PREDIABETES: Status: ACTIVE | Noted: 2024-10-30

## 2024-10-30 LAB
ERYTHROCYTE [DISTWIDTH] IN BLOOD BY AUTOMATED COUNT: 13.2 % (ref 10–15)
ERYTHROCYTE [SEDIMENTATION RATE] IN BLOOD BY WESTERGREN METHOD: 5 MM/HR (ref 0–15)
EST. AVERAGE GLUCOSE BLD GHB EST-MCNC: 117 MG/DL
HBA1C MFR BLD: 5.7 % (ref 0–5.6)
HCT VFR BLD AUTO: 45.4 % (ref 40–53)
HGB BLD-MCNC: 15.1 G/DL (ref 13.3–17.7)
MCH RBC QN AUTO: 28.6 PG (ref 26.5–33)
MCHC RBC AUTO-ENTMCNC: 33.3 G/DL (ref 31.5–36.5)
MCV RBC AUTO: 86 FL (ref 78–100)
PLATELET # BLD AUTO: 264 10E3/UL (ref 150–450)
RBC # BLD AUTO: 5.28 10E6/UL (ref 4.4–5.9)
WBC # BLD AUTO: 6.4 10E3/UL (ref 4–11)

## 2024-10-30 PROCEDURE — 84270 ASSAY OF SEX HORMONE GLOBUL: CPT | Performed by: NURSE PRACTITIONER

## 2024-10-30 PROCEDURE — 82306 VITAMIN D 25 HYDROXY: CPT | Performed by: NURSE PRACTITIONER

## 2024-10-30 PROCEDURE — 80061 LIPID PANEL: CPT | Performed by: NURSE PRACTITIONER

## 2024-10-30 PROCEDURE — 85652 RBC SED RATE AUTOMATED: CPT | Performed by: NURSE PRACTITIONER

## 2024-10-30 PROCEDURE — 84439 ASSAY OF FREE THYROXINE: CPT | Performed by: NURSE PRACTITIONER

## 2024-10-30 PROCEDURE — 36415 COLL VENOUS BLD VENIPUNCTURE: CPT | Performed by: NURSE PRACTITIONER

## 2024-10-30 PROCEDURE — 86618 LYME DISEASE ANTIBODY: CPT | Performed by: NURSE PRACTITIONER

## 2024-10-30 PROCEDURE — 86038 ANTINUCLEAR ANTIBODIES: CPT | Performed by: NURSE PRACTITIONER

## 2024-10-30 PROCEDURE — 83036 HEMOGLOBIN GLYCOSYLATED A1C: CPT | Performed by: NURSE PRACTITIONER

## 2024-10-30 PROCEDURE — 84443 ASSAY THYROID STIM HORMONE: CPT | Performed by: NURSE PRACTITIONER

## 2024-10-30 PROCEDURE — 90656 IIV3 VACC NO PRSV 0.5 ML IM: CPT | Performed by: NURSE PRACTITIONER

## 2024-10-30 PROCEDURE — 90471 IMMUNIZATION ADMIN: CPT | Performed by: NURSE PRACTITIONER

## 2024-10-30 PROCEDURE — 80053 COMPREHEN METABOLIC PANEL: CPT | Performed by: NURSE PRACTITIONER

## 2024-10-30 PROCEDURE — 99396 PREV VISIT EST AGE 40-64: CPT | Mod: 25 | Performed by: NURSE PRACTITIONER

## 2024-10-30 PROCEDURE — 99214 OFFICE O/P EST MOD 30 MIN: CPT | Mod: 25 | Performed by: NURSE PRACTITIONER

## 2024-10-30 PROCEDURE — 82607 VITAMIN B-12: CPT | Performed by: NURSE PRACTITIONER

## 2024-10-30 PROCEDURE — 84403 ASSAY OF TOTAL TESTOSTERONE: CPT | Performed by: NURSE PRACTITIONER

## 2024-10-30 PROCEDURE — 86431 RHEUMATOID FACTOR QUANT: CPT | Performed by: NURSE PRACTITIONER

## 2024-10-30 PROCEDURE — 85027 COMPLETE CBC AUTOMATED: CPT | Performed by: NURSE PRACTITIONER

## 2024-10-30 ASSESSMENT — PAIN SCALES - GENERAL: PAINLEVEL_OUTOF10: MILD PAIN (2)

## 2024-10-30 NOTE — PROGRESS NOTES
Assessment and Plan:     Encounter for routine history and physical exam for male  Recommend consuming a healthy diet and exercising.  He declines HIV and hepatitis C screening.  Provided influenza vaccine.  He declines COVID and hepatitis B vaccines.    Diabetes mellitus screening  - Hemoglobin A1c    Lipid screening  - Lipid panel reflex to direct LDL Fasting    Fatigue, unspecified type  Will rule out anemia, diabetes, vitamin deficiencies, autoimmune disease.  Further plans pending the results.  Patient is concerned of decreased libido.  Will check testosterone levels.  Further plans pending the results.  I encouraged him to continue to use CPAP for DILLON.  - CBC with platelets  - Comprehensive metabolic panel  - Vitamin D Deficiency  - Vitamin B12  - TSH with free T4 reflex  - Erythrocyte sedimentation rate auto  - Rheumatoid factor  - Anti Nuclear Xuan IgG by IFA with Reflex  - Lyme Disease Total Antibodies with Reflex to Confirmation  - Testosterone Free and Total    Bilateral hand pain  Differentials include osteoarthritis,, tendinitis, rheumatoid arthritis and other autoimmune disease.  Will rule out underlying autoimmune disease.  Will refer to orthopedics if symptoms persist.  - Orthopedic  Referral  - Erythrocyte sedimentation rate auto  - Rheumatoid factor  - Anti Nuclear Xuan IgG by IFA with Reflex  - Lyme Disease Total Antibodies with Reflex to Confirmation    Chronic pain of both knees  Suspect patellofemoral syndrome.  Discussed this is from overuse.  Recommend rest, ice, NSAIDs as needed when pain occurs.    Class 1 obesity without serious comorbidity with body mass index (BMI) of 33.0 to 33.9 in adult, unspecified obesity type  Recommend consuming a healthy diet and exercising.        Subjective:     Adalberto is a 44 year old male presenting to the clinic for a male physical. Patient has been  for 13 years. He has 3 children. His son is 9 years old and he has a twin boy and girl who are 7  years old.  He is not consuming healthy diet.  He denies exercise other than walking at his job.  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  Patient takes Pepcid as needed for acid reflux.  He has DILLON and uses a CPAP.  Patient has been experiencing fatigue for the past year.  He has had lack of motivation.  He has noticed a decrease in his sex drive.  He is sleeping 6 hours per night.  He does snore.  He denies unintentional weight loss, fever.  He has not had any tick bites, but camps [place] wooded areas.  He has not noticed unusual rashes.  He admits to an increase in stress with a promotion at his job.  He complains of bilateral hand pain.  Discomfort is worse within his DIP joints.  Pain is exacerbated when he bowls.  He has not noticed erythema, but his fingers will swell.  His fingers feel tight.  He takes ibuprofen.  He denies family history of autoimmune disease.  He does experience intermittent bilateral knee pain, primarily under his patella.  This will last for 2 days and occurs after activity.  He denies any injury.  Pain is worse at work.  He walks on concrete.    Reviewof Systems: A complete 14 point review of systems was obtained and is negative or as stated in the history of present illness.    Social History     Socioeconomic History    Marital status:      Spouse name: Not on file    Number of children: Not on file    Years of education: Not on file    Highest education level: Not on file   Occupational History    Not on file   Tobacco Use    Smoking status: Never     Passive exposure: Never    Smokeless tobacco: Never   Vaping Use    Vaping status: Never Used   Substance and Sexual Activity    Alcohol use: Yes     Comment: Alcoholic Drinks/day: rare    Drug use: No    Sexual activity: Yes     Partners: Female     Comment:    Other Topics Concern    Not on file   Social History Narrative    Not on file      Social Drivers of Health     Financial Resource Strain: Low Risk  (10/29/2024)    Financial Resource Strain     Within the past 12 months, have you or your family members you live with been unable to get utilities (heat, electricity) when it was really needed?: No   Food Insecurity: Low Risk  (10/29/2024)    Food Insecurity     Within the past 12 months, did you worry that your food would run out before you got money to buy more?: No     Within the past 12 months, did the food you bought just not last and you didn t have money to get more?: No   Transportation Needs: Low Risk  (10/29/2024)    Transportation Needs     Within the past 12 months, has lack of transportation kept you from medical appointments, getting your medicines, non-medical meetings or appointments, work, or from getting things that you need?: No   Physical Activity: Patient Declined (10/29/2024)    Exercise Vital Sign     Days of Exercise per Week: Patient declined     Minutes of Exercise per Session: Patient declined   Stress: Stress Concern Present (10/29/2024)    [place] [employer] - Occupational Stress Questionnaire     Feeling of Stress : Rather much   Social Connections: Unknown (10/29/2024)    Social Connection and Isolation Panel [NHANES]     Frequency of Communication with Friends and Family: Not on file     Frequency of Social Gatherings with Friends and Family: Once a week     Attends Mosque Services: Not on file     Active Member of Clubs or Organizations: Not on file     Attends Club or Organization Meetings: Not on file     Marital Status: Not on file   Interpersonal Safety: Low Risk  (10/30/2024)    Interpersonal Safety     Do you feel physically and emotionally safe where you currently live?: Yes     Within the past 12 months, have you been hit, slapped, kicked or otherwise physically hurt by someone?: No     Within the past 12 months, have you been humiliated or emotionally abused in other ways by your  "partner or ex-partner?: No   Housing Stability: Low Risk  (10/29/2024)    Housing Stability     Do you have housing? : Yes     Are you worried about losing your housing?: No       Active Ambulatory Problems     Diagnosis Date Noted    No Active Ambulatory Problems     Resolved Ambulatory Problems     Diagnosis Date Noted    COVID-19 virus infection 05/05/2021     Past Medical History:   Diagnosis Date    Nephrolithiasis        Family History   Problem Relation Age of Onset    No Known Problems Mother     Kidney Cancer Father     Heart Failure Maternal Grandmother     Dementia Maternal Grandfather     Cancer Paternal Grandmother        Objective:     /86   Pulse 77   Temp 97.1  F (36.2  C)   Resp 18   Ht 1.88 m (6' 2\")   Wt 119.7 kg (264 lb)   SpO2 96%   BMI 33.90 kg/m      Patient is alert, in no obvious distress.   Skin: Warm, dry.  No lesions or rashes.  Skin turgor rapid return.   HEENT:  Head normocephalic, atraumatic.  Eyes normal.  PERRL.  EOM's intact.  No nystagmus. Ears normal.  Nose patent, mucosa pink.  Oropharynx mucosa pink.  No lesions or tonsillar enlargement.   Neck: Supple, no lymphadenopathy, JVD, bruits noted.  No thyromegaly.  Lungs:  Clear to auscultation. Respirations even and unlabored.  No wheezing or rales noted.   Heart:  Regular rate and rhythm.  No murmurs, S3, S4, gallops, or rubs.    Abdomen: Soft, nontender.  No organomegaly. Bowel sounds normoactive. No guarding or masses noted.   : deferred  Musculoskeletal:  Full ROM of extremities.  DTRs symmetrical, sensations intact.  No obvious deformity.  Muscle strength equal +5/5.   Neurological:  Cranial nerves 2-12 intact.                "

## 2024-10-31 ENCOUNTER — PATIENT OUTREACH (OUTPATIENT)
Dept: CARE COORDINATION | Facility: CLINIC | Age: 44
End: 2024-10-31
Payer: COMMERCIAL

## 2024-10-31 DIAGNOSIS — E03.8 SUBCLINICAL HYPOTHYROIDISM: Primary | ICD-10-CM

## 2024-10-31 DIAGNOSIS — E55.9 VITAMIN D DEFICIENCY: Primary | ICD-10-CM

## 2024-10-31 LAB
ALBUMIN SERPL BCG-MCNC: 4.4 G/DL (ref 3.5–5.2)
ALP SERPL-CCNC: 84 U/L (ref 40–150)
ALT SERPL W P-5'-P-CCNC: 45 U/L (ref 0–70)
ANA SER QL IF: NEGATIVE
ANION GAP SERPL CALCULATED.3IONS-SCNC: 12 MMOL/L (ref 7–15)
AST SERPL W P-5'-P-CCNC: 29 U/L (ref 0–45)
B BURGDOR IGG+IGM SER QL: 0.13
BILIRUB SERPL-MCNC: 0.7 MG/DL
BUN SERPL-MCNC: 11.3 MG/DL (ref 6–20)
CALCIUM SERPL-MCNC: 9.3 MG/DL (ref 8.8–10.4)
CHLORIDE SERPL-SCNC: 106 MMOL/L (ref 98–107)
CHOLEST SERPL-MCNC: 208 MG/DL
CREAT SERPL-MCNC: 1.03 MG/DL (ref 0.67–1.17)
EGFRCR SERPLBLD CKD-EPI 2021: >90 ML/MIN/1.73M2
FASTING STATUS PATIENT QL REPORTED: ABNORMAL
FASTING STATUS PATIENT QL REPORTED: ABNORMAL
GLUCOSE SERPL-MCNC: 106 MG/DL (ref 70–99)
HCO3 SERPL-SCNC: 24 MMOL/L (ref 22–29)
HDLC SERPL-MCNC: 41 MG/DL
LDLC SERPL CALC-MCNC: 143 MG/DL
NONHDLC SERPL-MCNC: 167 MG/DL
POTASSIUM SERPL-SCNC: 4.2 MMOL/L (ref 3.4–5.3)
PROT SERPL-MCNC: 7.2 G/DL (ref 6.4–8.3)
RHEUMATOID FACT SERPL-ACNC: <10 IU/ML
SHBG SERPL-SCNC: 20 NMOL/L (ref 11–80)
SODIUM SERPL-SCNC: 142 MMOL/L (ref 135–145)
T4 FREE SERPL-MCNC: 1.22 NG/DL (ref 0.9–1.7)
TRIGL SERPL-MCNC: 119 MG/DL
TSH SERPL DL<=0.005 MIU/L-ACNC: 7.63 UIU/ML (ref 0.3–4.2)
VIT B12 SERPL-MCNC: 352 PG/ML (ref 232–1245)
VIT D+METAB SERPL-MCNC: 19 NG/ML (ref 20–50)

## 2024-10-31 RX ORDER — ERGOCALCIFEROL 1.25 MG/1
50000 CAPSULE, LIQUID FILLED ORAL WEEKLY
Qty: 12 CAPSULE | Refills: 0 | Status: SHIPPED | OUTPATIENT
Start: 2024-10-31

## 2024-11-01 ENCOUNTER — TRANSFERRED RECORDS (OUTPATIENT)
Dept: HEALTH INFORMATION MANAGEMENT | Facility: CLINIC | Age: 44
End: 2024-11-01
Payer: COMMERCIAL

## 2024-11-01 DIAGNOSIS — R79.89 LOW TESTOSTERONE: Primary | ICD-10-CM

## 2024-11-01 LAB
TESTOST FREE SERPL-MCNC: 5.98 NG/DL
TESTOST SERPL-MCNC: 239 NG/DL (ref 240–950)

## 2024-11-09 ENCOUNTER — OFFICE VISIT (OUTPATIENT)
Dept: ORTHOPEDICS | Facility: CLINIC | Age: 44
End: 2024-11-09
Attending: NURSE PRACTITIONER
Payer: COMMERCIAL

## 2024-11-09 ENCOUNTER — ANCILLARY PROCEDURE (OUTPATIENT)
Dept: GENERAL RADIOLOGY | Facility: CLINIC | Age: 44
End: 2024-11-09
Attending: PHYSICIAN ASSISTANT
Payer: COMMERCIAL

## 2024-11-09 DIAGNOSIS — M79.642 BILATERAL HAND PAIN: Primary | ICD-10-CM

## 2024-11-09 DIAGNOSIS — R20.0 NUMBNESS IN BOTH HANDS: ICD-10-CM

## 2024-11-09 DIAGNOSIS — M79.642 BILATERAL HAND PAIN: ICD-10-CM

## 2024-11-09 DIAGNOSIS — M79.641 BILATERAL HAND PAIN: ICD-10-CM

## 2024-11-09 DIAGNOSIS — M79.641 BILATERAL HAND PAIN: Primary | ICD-10-CM

## 2024-11-09 PROCEDURE — 99204 OFFICE O/P NEW MOD 45 MIN: CPT | Performed by: PHYSICIAN ASSISTANT

## 2024-11-09 PROCEDURE — 73120 X-RAY EXAM OF HAND: CPT | Mod: TC | Performed by: RADIOLOGY

## 2024-11-09 NOTE — PROGRESS NOTES
ASSESSMENT & PLAN       Today we discussed the underlying etiology/pathology of patient's   1. Bilateral hand pain  R>L    2. Numbness in both hands      Discussed diagnosis and treatment options with the patient today. A shared decision making model was used. The patient's values and choices were respected. The following represents what was discussed and decided upon by the provider and the patient.   -We discussed patient's chronic bilateral upper extremity symptoms largely from the elbows down into the hands.  We discussed his previous work comp claims dating back to age 18-20 which was formally closed with no further follow-up  - We reviewed his x-rays of his hands today which show no evidence of high-grade arthritis, fracture or other abnormality  - Physical exam today does not show any muscle weakness or bridger musculoskeletal reproduction of pain to palpation or tendon/muscle testing  - Patient does show some evidence of nerve irritation which potentially could be a combination of carpal tunnel and cubital tunnel syndrome.  - Patient will be sent for bilateral upper extremity EMGs.  I will contact the patient via telephone with results and treatment plan will be updated at that time  - Patient already is dealing with some sedation issues.  We discussed utilizing gabapentin which currently is being declined due to potential side effects and worsening of his fatigue.    -Call direct clinic number [[phone]] at any time with questions or concerns in regards to your recent office visit with me.     Adalberto Edward PA-C  [facility]    This note was completed in part using a voice recognition software, any grammatical or context distortion are unintentional and inherent to the software.         SUBJECTIVE  Adalberto Antonio is a/an 44 year old male who is seen in consultation at the request of  Tanisha Goayl C.N.P. for evaluation of bilateral R>L hand pain, stiffness, tingling and  weakness. The patient is seen by themselves.    Onset: age 18.  Patient states that he was doing a lot of labor-intensive duties at a young age working with [employer].  He had formal work comp claim placed in regards to bilateral upper extremity symptoms.  Ultimately patient was let go from that employment did not continue to pursue his work comp claim.  Symptoms have continually waxed and waned but have persisted this entire duration    Patient has had negative blood work for rheumatology workup recently.    Location of Pain: bilateral R>L elbow down the forearms into largely the fourth and fifth digits of both hands.  He describes stiffness of both hands with aggravation of symptoms with heavy labor.  He describes tingling as well as frequently losing strength and dropping objects even as light as paper.  He is right-hand dominant.  Worsened by: Heavy labor  Better with: Nothing has really improved his symptoms  Treatments tried: Patient used to wear wrist braces years ago.  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         Nephrolithiasis      Social History     Socioeconomic History    Marital status:    Tobacco Use    Smoking status: Never     Passive exposure: Never    Smokeless tobacco: Never   Vaping Use    Vaping status: Never Used   Substance and Sexual Activity    Alcohol use: Yes     Comment: Alcoholic Drinks/day: rare    Drug use: No    Sexual activity: Yes     Partners: Female     Comment:      Social Drivers of Health     Financial Resource Strain: Low Risk  (10/29/2024)    Financial Resource Strain     Within the past  12 months, have you or your family members you live with been unable to get utilities (heat, electricity) when it was really needed?: No   Food Insecurity: Low Risk  (10/29/2024)    Food Insecurity     Within the past 12 months, did you worry that your food would run out before you got money to buy more?: No     Within the past 12 months, did the food you bought just not last and you didn t have money to get more?: No   Transportation Needs: Low Risk  (10/29/2024)    Transportation Needs     Within the past 12 months, has lack of transportation kept you from medical appointments, getting your medicines, non-medical meetings or appointments, work, or from getting things that you need?: No   Physical Activity: Patient Declined (10/29/2024)    Exercise Vital Sign     Days of Exercise per Week: Patient declined     Minutes of Exercise per Session: Patient declined   Stress: Stress Concern Present (10/29/2024)    [place] [employer] - Occupational Stress Questionnaire     Feeling of Stress : Rather much   Social Connections: Unknown (10/29/2024)    Social Connection and Isolation Panel [NHANES]     Frequency of Social Gatherings with Friends and Family: Once a week   Interpersonal Safety: Low Risk  (10/30/2024)    Interpersonal Safety     Do you feel physically and emotionally safe where you currently live?: Yes     Within the past 12 months, have you been hit, slapped, kicked or otherwise physically hurt by someone?: No     Within the past 12 months, have you been humiliated or emotionally abused in other ways by your partner or ex-partner?: No   Housing Stability: Low Risk  (10/29/2024)    Housing Stability     Do you have housing? : Yes     Are you worried about losing your housing?: No         Patient's past medical, surgical, social, and family histories were personally reviewed today and no changes are noted.    REVIEW OF SYSTEMS:  10 point ROS is negative other than symptoms noted above in  HPI, Past Medical History or as stated below  Constitutional: NEGATIVE for fever, chills, change in weight  Skin: NEGATIVE for worrisome rashes, moles or lesions  GI/: NEGATIVE for bowel or bladder changes  Neuro: NEGATIVE for weakness, dizziness or paresthesias    OBJECTIVE:  Vital signs as noted in EPIC for 11/9/2024  General: healthy, alert and in no distress  HEENT: no scleral icterus or conjunctival erythema  Skin: no suspicious lesions or rash. No jaundice.  CV: no pedal edema  Resp: normal respiratory effort without conversational dyspnea   Psych: normal mood and affect  Neuro: Normal light sensory exam of lower extremity      MSK:  Exam shows well-nourished 44-year-old male who ambulates full weightbearing without assistive device.  He is alert and oriented x 3.  Examination of both upper extremities show no obvious bruising, swelling or ecchymosis.  No skin breakdown, rashes or lesions.  Nails are normal without pitting or spooning.  No obvious swelling or deformities of either hand.  No enlargement of the DIP, PIP or MCP joints.  No sway deformities.  No evidence of chronic synovitis.  Patient is able to make a closed fist and open his hands fully.  He describes some mild stiffness by doing so.  Flexor and extensor tendons of the fingers are intact without pain or weakness.  He has 5 out of 5 motor strength with multidirectional wrist resistance testing without reproduction of symptoms or pain.  No pain throughout the first CMC joint or remaining carpus.  No pain to palpation throughout the entire hands bilaterally.  Radial pulses are +2 and symmetric.  He shows no bridger tenderness throughout the flexor or extensor muscles of the forearms.  No pain over the medial lateral epicondyles.  Varus and valgus stressing of the ulnar collateral and radial collateral ligaments of the elbow are stable without pain.  Prolonged flexion test of the elbows seems to reproduce some slight abnormal sensation in both  forearms.  Durkan test causes numbness and tingling of the thumb and index finger bilaterally.  Prolonged Phalen's also reproduces symptoms in the thumb and index fingers right greater than left.  Tinel's at the elbow and wrist are negative.  Patient has 5 out of 5 motor strength of the intrinsic muscles of the hands bilaterally without pain.  Piano key testing of the digits does not reproduce symptoms.            Personal Independent visualization of the below images done today:  Three-view x-ray of patient's bilateral hands are obtained and reviewed today.  Images reviewed with patient today.  No evidence of high-grade arthritis.  No evidence of fracture or dislocation.  Normal x-rays    Patient's previous rheumatology blood workup reviewed today personally which is negative    Patient's conditions were thoroughly discussed during today's visit with total time reviewing patient's previous medical records/history/radiology, face-to-face examination and discussion and plan of care with the patient and documentation being 45 minutes for today's clinical visit  Adalberto Edward PA-C  [facility]    This note was completed in part using a voice recognition software, any grammatical or context distortion are unintentional and inherent to the software.

## 2024-11-09 NOTE — PATIENT INSTRUCTIONS
Today we discussed the underlying etiology/pathology of patient's   1. Bilateral hand pain  R>L    2. Numbness in both hands      Discussed diagnosis and treatment options with the patient today. A shared decision making model was used. The patient's values and choices were respected. The following represents what was discussed and decided upon by the provider and the patient.   -We discussed patient's chronic bilateral upper extremity symptoms largely from the elbows down into the hands.  We discussed his previous work comp claims dating back to age 18-20 which was formally closed with no further follow-up  - We reviewed his x-rays of his hands today which show no evidence of high-grade arthritis, fracture or other abnormality  - Physical exam today does not show any muscle weakness or bridger musculoskeletal reproduction of pain to palpation or tendon/muscle testing  - Patient does show some evidence of nerve irritation which potentially could be a combination of carpal tunnel and cubital tunnel syndrome.  - Patient will be sent for bilateral upper extremity EMGs.  I will contact the patient via telephone with results and treatment plan will be updated at that time  - Patient already is dealing with some sedation issues.  We discussed utilizing gabapentin which currently is being declined due to potential side effects and worsening of his fatigue.    -Call direct clinic number [[phone]] at any time with questions or concerns in regards to your recent office visit with me.     Adalberto Edward PA-C  [facility]    This note was completed in part using a voice recognition software, any grammatical or context distortion are unintentional and inherent to the software.

## 2024-11-09 NOTE — LETTER
11/9/2024      Adalberto Antonio  [address] [address] [place] [id number]      Dear Colleague,    Thank you for referring your patient, Adalberto Antonio, to the [facility] SP[facility]TS [facility]IC[facility]INIC [facility]. Please see a copy of my visit note below.    ASSESSMENT & PLAN       Today we discussed the underlying etiology/pathology of patient's   1. Bilateral hand pain  R>L    2. Numbness in both hands      Discussed diagnosis and treatment options with the patient today. A shared decision making model was used. The patient's values and choices were respected. The following represents what was discussed and decided upon by the provider and the patient.   -We discussed patient's chronic bilateral upper extremity symptoms largely from the elbows down into the hands.  We discussed his previous work comp claims dating back to age 18-20 which was formally closed with no further follow-up  - We reviewed his x-rays of his hands today which show no evidence of high-grade arthritis, fracture or other abnormality  - Physical exam today does not show any muscle weakness or bridger musculoskeletal reproduction of pain to palpation or tendon/muscle testing  - Patient does show some evidence of nerve irritation which potentially could be a combination of carpal tunnel and cubital tunnel syndrome.  - Patient will be sent for bilateral upper extremity EMGs.  I will contact the patient via telephone with results and treatment plan will be updated at that time  - Patient already is dealing with some sedation issues.  We discussed utilizing gabapentin which currently is being declined due to potential side effects and worsening of his fatigue.    -Call direct clinic number [[phone]] at any time with questions or concerns in regards to your recent office visit with me.     Adalberto Edward PA-C  [facility]    This note was completed in part using a voice recognition software, any grammatical or context  distortion are unintentional and inherent to the software.         SUBJECTIVE  Adalberto Antonio is a/an 44 year old male who is seen in consultation at the request of  Tanisha Goyal C.N.P. for evaluation of bilateral R>L hand pain, stiffness, tingling and weakness. The patient is seen by themselves.    Onset: age 18.  Patient states that he was doing a lot of labor-intensive duties at a young age working with [employer].  He had formal work comp claim placed in regards to bilateral upper extremity symptoms.  Ultimately patient was let go from that employment did not continue to pursue his work comp claim.  Symptoms have continually waxed and waned but have persisted this entire duration    Patient has had negative blood work for rheumatology workup recently.    Location of Pain: bilateral R>L elbow down the forearms into largely the fourth and fifth digits of both hands.  He describes stiffness of both hands with aggravation of symptoms with heavy labor.  He describes tingling as well as frequently losing strength and dropping objects even as light as paper.  He is right-hand dominant.  Worsened by: Heavy labor  Better with: Nothing has really improved his symptoms  Treatments tried: Patient used to wear wrist braces years ago.  He occasionally will take ibuprofen for bad symptoms but this is not daily  Quality: Numbness, stiffness and achiness  Orthopedic history: YES - Date: Work comp claim for both upper extremities at age 18-20 years old   Relevant surgical history: NO  Social history: social history: works at [employer] company    Past Medical History:   Diagnosis Date     COVID-19 virus infection 05/05/2021    Diagnosed 4/2021.          Nephrolithiasis      Social History     Socioeconomic History     Marital status:    Tobacco Use     Smoking status: Never     Passive exposure: Never     Smokeless tobacco: Never   Vaping Use     Vaping status: Never Used   Substance and Sexual Activity      Alcohol use: Yes     Comment: Alcoholic Drinks/day: rare     Drug use: No     Sexual activity: Yes     Partners: Female     Comment:      Social Drivers of Health     Financial Resource Strain: Low Risk  (10/29/2024)    Financial Resource Strain      Within the past 12 months, have you or your family members you live with been unable to get utilities (heat, electricity) when it was really needed?: No   Food Insecurity: Low Risk  (10/29/2024)    Food Insecurity      Within the past 12 months, did you worry that your food would run out before you got money to buy more?: No      Within the past 12 months, did the food you bought just not last and you didn t have money to get more?: No   Transportation Needs: Low Risk  (10/29/2024)    Transportation Needs      Within the past 12 months, has lack of transportation kept you from medical appointments, getting your medicines, non-medical meetings or appointments, work, or from getting things that you need?: No   Physical Activity: Patient Declined (10/29/2024)    Exercise Vital Sign      Days of Exercise per Week: Patient declined      Minutes of Exercise per Session: Patient declined   Stress: Stress Concern Present (10/29/2024)    [employer] [place] [employer] - Occupational Stress Questionnaire      Feeling of Stress : Rather much   Social Connections: Unknown (10/29/2024)    Social Connection and Isolation Panel [NHANES]      Frequency of Social Gatherings with Friends and Family: Once a week   Interpersonal Safety: Low Risk  (10/30/2024)    Interpersonal Safety      Do you feel physically and emotionally safe where you currently live?: Yes      Within the past 12 months, have you been hit, slapped, kicked or otherwise physically hurt by someone?: No      Within the past 12 months, have you been humiliated or emotionally abused in other ways by your partner or ex-partner?: No   Housing Stability: Low Risk  (10/29/2024)    Housing Stability      Do you  have housing? : Yes      Are you worried about losing your housing?: No         Patient's past medical, surgical, social, and family histories were personally reviewed today and no changes are noted.    REVIEW OF SYSTEMS:  10 point ROS is negative other than symptoms noted above in HPI, Past Medical History or as stated below  Constitutional: NEGATIVE for fever, chills, change in weight  Skin: NEGATIVE for worrisome rashes, moles or lesions  GI/: NEGATIVE for bowel or bladder changes  Neuro: NEGATIVE for weakness, dizziness or paresthesias    OBJECTIVE:  Vital signs as noted in EPIC for 11/9/2024  General: healthy, alert and in no distress  HEENT: no scleral icterus or conjunctival erythema  Skin: no suspicious lesions or rash. No jaundice.  CV: no pedal edema  Resp: normal respiratory effort without conversational dyspnea   Psych: normal mood and affect  Neuro: Normal light sensory exam of lower extremity      MSK:  Exam shows well-nourished 44-year-old male who ambulates full weightbearing without assistive device.  He is alert and oriented x 3.  Examination of both upper extremities show no obvious bruising, swelling or ecchymosis.  No skin breakdown, rashes or lesions.  Nails are normal without pitting or spooning.  No obvious swelling or deformities of either hand.  No enlargement of the DIP, PIP or MCP joints.  No sway deformities.  No evidence of chronic synovitis.  Patient is able to make a closed fist and open his hands fully.  He describes some mild stiffness by doing so.  Flexor and extensor tendons of the fingers are intact without pain or weakness.  He has 5 out of 5 motor strength with multidirectional wrist resistance testing without reproduction of symptoms or pain.  No pain throughout the first CMC joint or remaining carpus.  No pain to palpation throughout the entire hands bilaterally.  Radial pulses are +2 and symmetric.  He shows no bridger tenderness throughout the flexor or extensor muscles of  the forearms.  No pain over the medial lateral epicondyles.  Varus and valgus stressing of the ulnar collateral and radial collateral ligaments of the elbow are stable without pain.  Prolonged flexion test of the elbows seems to reproduce some slight abnormal sensation in both forearms.  Durkan test causes numbness and tingling of the thumb and index finger bilaterally.  Prolonged Phalen's also reproduces symptoms in the thumb and index fingers right greater than left.  Tinel's at the elbow and wrist are negative.  Patient has 5 out of 5 motor strength of the intrinsic muscles of the hands bilaterally without pain.  Piano key testing of the digits does not reproduce symptoms.            Personal Independent visualization of the below images done today:  Three-view x-ray of patient's bilateral hands are obtained and reviewed today.  Images reviewed with patient today.  No evidence of high-grade arthritis.  No evidence of fracture or dislocation.  Normal x-rays    Patient's previous rheumatology blood workup reviewed today personally which is negative    Patient's conditions were thoroughly discussed during today's visit with total time reviewing patient's previous medical records/history/radiology, face-to-face examination and discussion and plan of care with the patient and documentation being 45 minutes for today's clinical visit  Adalberto Edward PA-C  [facility]    This note was completed in part using a voice recognition software, any grammatical or context distortion are unintentional and inherent to the software.       Again, thank you for allowing me to participate in the care of your patient.        Sincerely,        Adalberto Edward PA-C

## 2024-11-20 ENCOUNTER — MYC MEDICAL ADVICE (OUTPATIENT)
Dept: ORTHOPEDICS | Facility: CLINIC | Age: 44
End: 2024-11-20

## 2024-11-20 ENCOUNTER — OFFICE VISIT (OUTPATIENT)
Dept: NEUROLOGY | Facility: CLINIC | Age: 44
End: 2024-11-20
Attending: PHYSICIAN ASSISTANT
Payer: COMMERCIAL

## 2024-11-20 DIAGNOSIS — M79.642 BILATERAL HAND PAIN: ICD-10-CM

## 2024-11-20 DIAGNOSIS — M79.641 BILATERAL HAND PAIN: ICD-10-CM

## 2024-11-20 DIAGNOSIS — M79.642 BILATERAL HAND PAIN: Primary | ICD-10-CM

## 2024-11-20 DIAGNOSIS — R20.0 NUMBNESS IN BOTH HANDS: ICD-10-CM

## 2024-11-20 DIAGNOSIS — M79.641 BILATERAL HAND PAIN: Primary | ICD-10-CM

## 2024-11-20 NOTE — PROGRESS NOTES
[facility]  Electrodiagnostic Laboratory                 Department of Neurology                                                                                                         Test Date:  2024    Patient: Adalberto Antonio : 1980 Physician: Zachary Renee MD   Sex: Male AGE: 44 year Ref Phys: Adalberto Edward PA-C   ID#: [id number]   Technician: Kristy Behling     History and Examination:  44 year old with paresthesias and discomfort in the fingers (digits 4 and 5) and wrist. Both sides are affected, but the right is more affected than the left. This study is being performed to investigate for radiculopathy vs focal neuropathy.     Techniques:  Motor and sensory conduction studies were done with surface recording electrodes. EMG was done with a concentric needle electrode.     Results:  Nerve conduction studies:  1. Bilateral median-D2 and ulnar-D5 sensory responses are normal.   2. Bilateral median-ulnar palmar interlatency differences are normal.   3. Bilateral median-APB and ulnar-ADM motor responses are normal.     Needle EMG of selected proximal and distal right and left upper limb muscles was performed as tabulated below. No abnormal spontaneous activity was observed in the sampled muscles. Motor unit potential morphology and recruitment patterns were normal.     Interpretation:  This is a normal study. There is no electrophysiologic evidence of a focal neuropathy or cervical radiculopathy affecting the upper limbs on the basis of this study.    Zachary Renee MD  Department of Neurology        Nerve Conduction Studies  Motor Sites      Latency Neg. Amp Neg. Amp Diff Segment Distance Velocity Neg. Dur Neg Area Diff Temperature Comment   Site (ms) Norm (mV) Norm (%)  cm m/s Norm (ms) (%) ( C)    Left Median (APB) Motor   Wrist 3.6  < 4.4 8.6  > 5.0  Wrist-APB 8   6.6  30.3    Elbow 7.9 - 7.8  > 5.0 -9 Elbow-Wrist 24 56  > 48 6.4 -9 30.2    Right Median (APB) Motor    Wrist 3.7  < 4.4 9.0  > 5.0  Wrist-APB 8   6.5  30.9    Elbow 7.9 - 8.2  > 5.0 -9 Elbow-Wrist 22 52  > 48 6.5 -6 30.9    Left Ulnar (ADM) Motor   Wrist 2.8  < 3.5 9.6  > 5.0  Wrist-ADM 8   6.4  30.1    Below Elbow 6.9 - 9.0 - -6 Below Elbow-Wrist 24 59  > 48 6.2 -4 30.1    Above Elbow 8.4 - 9.3 - 3 Above Elbow-Below Elbow 9 60  > 48 6.2 1 30.1    Right Ulnar (ADM) Motor   Wrist 2.8  < 3.5 8.8  > 5.0  Wrist-ADM 8   6.0  31    Below Elbow 6.9 - 8.7 - -1 Below Elbow-Wrist 23 56  > 48 5.9 -6 31    Above Elbow 8.7 - 8.0 - -8 Above Elbow-Below Elbow 9 50  > 48 6.0 -7 31.1      Sensory Sites      Onset Lat Peak Lat Amp (O-P) Amp (P-P) Segment Distance Velocity Temperature Comment   Site ms (ms)  V Norm ( V)  cm m/s Norm ( C)    Left Median Sensory   Wrist-Dig II 2.5 3.2 24  > 10 39 Wrist-Dig II 14 56  > 48 30.7    Right Median Sensory   Wrist-Dig II 2.3 3.2 31  > 10 49 Wrist-Dig II 14 61  > 48 29.7    Left Median-Ulnar Palmar Sensory        Median   Palm-Wrist 1.40 1.90 21 - 26 Palm-Wrist 8 57 - 30.5         Ulnar   Palm-Wrist 1.53 2.0 10 - 9 Palm-Wrist 8 52 - 30.4    Right Median-Ulnar Palmar Sensory        Median   Palm-Wrist 1.55 2.1 42 - 49 Palm-Wrist 8 52 - 30.7         Ulnar   Palm-Wrist 1.45 1.95 7 - 7 Palm-Wrist 8 55 - 30.8    Left Ulnar Sensory   Wrist-Dig V 2.1 3.1 16  > 8 44 Wrist-Dig V 12.5 60  > 48 30.5    Right Ulnar Sensory   Wrist-Dig V 2.1 3.0 23  > 8 60 Wrist-Dig V 12.5 60  > 48 30.5      Inter-Nerve Comparisons     Nerve 1 Value 1 Nerve 2 Value 2 Parameter Result Normal   Sensory Sites   R Median Palm-Wrist 2.1 ms R Ulnar Palm-Wrist 1.95 ms Peak Lat Diff 0.15 ms <0.40   L Median Palm-Wrist 1.90 ms L Ulnar Palm-Wrist 2.0 ms Peak Lat Diff 0.10 ms <0.40     F Wave Studies     Min-F Max-F Dispersion Persistence Mean-F F-Norm L-R Mean-F L-R Mean-F Norm F/M Ratio F-M Lat (ms)   Left Median (Abd Poll Brev)  30.1  C   28.83 32.27 3.44 57.14 30.82 <33 2.11 <2.2 1.32 25.47   Right Median (Abd Poll Brev)  31  C    26.56 30.47 3.91 57.14 28.71 <33 2.11 <2.2 1.91 25.47   Right Ulnar (Abd Dig Min)  31.1  C   29.30 31.80 2.50 100.00 30.98 <36  <2.5 0.49 26.80       Electromyography     Side Muscle Ins Act Fibs/PSW Fasc HF Amp Dur Poly Recrt Int Pat   Right Deltoid Nml None Nml 0 Nml Nml 0 Nml Nml   Right Biceps Nml None Nml 0 Nml Nml 0 Nml Nml   Right Triceps Nml None Nml 0 Nml Nml 0 Nml Nml   Right Pronator Teres Nml None Nml 0 Nml Nml 0 Nml Nml   Right FDI Nml None Nml 0 Nml Nml 0 Nml Nml   Left Deltoid Nml None Nml 0 Nml Nml 0 Nml Nml   Left Triceps Nml None Nml 0 Nml Nml 0 Nml Nml   Left FDI Nml None Nml 0 Nml Nml 0 Nml Nml         NCS Waveforms:    Motor                Sensory                           Ultrasound Images:

## 2024-11-20 NOTE — LETTER
2024       RE: Adalberto Antonio  [address] [facility] [address]     Dear Colleague,    Thank you for referring your patient, Adalberto Antonio, to the [facility] EMG CLINIC [place] at [facility]. Please see a copy of my visit note below.                        [facility]  Electrodiagnostic Laboratory                 Department of Neurology                                                                                                         Test Date:  2024    Patient: Adalberto Antonio : 1980 Physician: Zachary Renee MD   Sex: Male AGE: 44 year Ref Phys: Adalberto Edward PA-C   ID#: [id number]   Technician: Kristy Behling     History and Examination:  44 year old with paresthesias and discomfort in the fingers (digits 4 and 5) and wrist. Both sides are affected, but the right is more affected than the left. This study is being performed to investigate for radiculopathy vs focal neuropathy.     Techniques:  Motor and sensory conduction studies were done with surface recording electrodes. EMG was done with a concentric needle electrode.     Results:  Nerve conduction studies:  1. Bilateral median-D2 and ulnar-D5 sensory responses are normal.   2. Bilateral median-ulnar palmar interlatency differences are normal.   3. Bilateral median-APB and ulnar-ADM motor responses are normal.     Needle EMG of selected proximal and distal right and left upper limb muscles was performed as tabulated below. No abnormal spontaneous activity was observed in the sampled muscles. Motor unit potential morphology and recruitment patterns were normal.     Interpretation:  This is a normal study. There is no electrophysiologic evidence of a focal neuropathy or cervical radiculopathy affecting the upper limbs on the basis of this study.    Zachary Renee MD  Department of Neurology        Nerve Conduction Studies  Motor Sites      Latency Neg. Amp Neg.  Amp Diff Segment Distance Velocity Neg. Dur Neg Area Diff Temperature Comment   Site (ms) Norm (mV) Norm (%)  cm m/s Norm (ms) (%) ( C)    Left Median (APB) Motor   Wrist 3.6  < 4.4 8.6  > 5.0  Wrist-APB 8   6.6  30.3    Elbow 7.9 - 7.8  > 5.0 -9 Elbow-Wrist 24 56  > 48 6.4 -9 30.2    Right Median (APB) Motor   Wrist 3.7  < 4.4 9.0  > 5.0  Wrist-APB 8   6.5  30.9    Elbow 7.9 - 8.2  > 5.0 -9 Elbow-Wrist 22 52  > 48 6.5 -6 30.9    Left Ulnar (ADM) Motor   Wrist 2.8  < 3.5 9.6  > 5.0  Wrist-ADM 8   6.4  30.1    Below Elbow 6.9 - 9.0 - -6 Below Elbow-Wrist 24 59  > 48 6.2 -4 30.1    Above Elbow 8.4 - 9.3 - 3 Above Elbow-Below Elbow 9 60  > 48 6.2 1 30.1    Right Ulnar (ADM) Motor   Wrist 2.8  < 3.5 8.8  > 5.0  Wrist-ADM 8   6.0  31    Below Elbow 6.9 - 8.7 - -1 Below Elbow-Wrist 23 56  > 48 5.9 -6 31    Above Elbow 8.7 - 8.0 - -8 Above Elbow-Below Elbow 9 50  > 48 6.0 -7 31.1      Sensory Sites      Onset Lat Peak Lat Amp (O-P) Amp (P-P) Segment Distance Velocity Temperature Comment   Site ms (ms)  V Norm ( V)  cm m/s Norm ( C)    Left Median Sensory   Wrist-Dig II 2.5 3.2 24  > 10 39 Wrist-Dig II 14 56  > 48 30.7    Right Median Sensory   Wrist-Dig II 2.3 3.2 31  > 10 49 Wrist-Dig II 14 61  > 48 29.7    Left Median-Ulnar Palmar Sensory        Median   Palm-Wrist 1.40 1.90 21 - 26 Palm-Wrist 8 57 - 30.5         Ulnar   Palm-Wrist 1.53 2.0 10 - 9 Palm-Wrist 8 52 - 30.4    Right Median-Ulnar Palmar Sensory        Median   Palm-Wrist 1.55 2.1 42 - 49 Palm-Wrist 8 52 - 30.7         Ulnar   Palm-Wrist 1.45 1.95 7 - 7 Palm-Wrist 8 55 - 30.8    Left Ulnar Sensory   Wrist-Dig V 2.1 3.1 16  > 8 44 Wrist-Dig V 12.5 60  > 48 30.5    Right Ulnar Sensory   Wrist-Dig V 2.1 3.0 23  > 8 60 Wrist-Dig V 12.5 60  > 48 30.5      Inter-Nerve Comparisons     Nerve 1 Value 1 Nerve 2 Value 2 Parameter Result Normal   Sensory Sites   R Median Palm-Wrist 2.1 ms R Ulnar Palm-Wrist 1.95 ms Peak Lat Diff 0.15 ms <0.40   L Median Palm-Wrist 1.90 ms  L Ulnar Palm-Wrist 2.0 ms Peak Lat Diff 0.10 ms <0.40     F Wave Studies     Min-F Max-F Dispersion Persistence Mean-F F-Norm L-R Mean-F L-R Mean-F Norm F/M Ratio F-M Lat (ms)   Left Median (Abd Poll Brev)  30.1  C   28.83 32.27 3.44 57.14 30.82 <33 2.11 <2.2 1.32 25.47   Right Median (Abd Poll Brev)  31  C   26.56 30.47 3.91 57.14 28.71 <33 2.11 <2.2 1.91 25.47   Right Ulnar (Abd Dig Min)  31.1  C   29.30 31.80 2.50 100.00 30.98 <36  <2.5 0.49 26.80       Electromyography     Side Muscle Ins Act Fibs/PSW Fasc HF Amp Dur Poly Recrt Int Pat   Right Deltoid Nml None Nml 0 Nml Nml 0 Nml Nml   Right Biceps Nml None Nml 0 Nml Nml 0 Nml Nml   Right Triceps Nml None Nml 0 Nml Nml 0 Nml Nml   Right Pronator Teres Nml None Nml 0 Nml Nml 0 Nml Nml   Right FDI Nml None Nml 0 Nml Nml 0 Nml Nml   Left Deltoid Nml None Nml 0 Nml Nml 0 Nml Nml   Left Triceps Nml None Nml 0 Nml Nml 0 Nml Nml   Left FDI Nml None Nml 0 Nml Nml 0 Nml Nml         NCS Waveforms:    Motor                Sensory                           Ultrasound Images:                Again, thank you for allowing me to participate in the care of your patient.      Sincerely,    Zachary Renee MD

## 2024-11-24 ENCOUNTER — MYC REFILL (OUTPATIENT)
Dept: FAMILY MEDICINE | Facility: CLINIC | Age: 44
End: 2024-11-24
Payer: COMMERCIAL

## 2024-11-24 DIAGNOSIS — E55.9 VITAMIN D DEFICIENCY: ICD-10-CM

## 2024-11-24 RX ORDER — ERGOCALCIFEROL 1.25 MG/1
50000 CAPSULE, LIQUID FILLED ORAL WEEKLY
Qty: 12 CAPSULE | Refills: 0 | Status: CANCELLED | OUTPATIENT
Start: 2024-11-24

## 2024-12-23 ENCOUNTER — THERAPY VISIT (OUTPATIENT)
Dept: OCCUPATIONAL THERAPY | Facility: REHABILITATION | Age: 44
End: 2024-12-23
Attending: PHYSICIAN ASSISTANT
Payer: COMMERCIAL

## 2024-12-23 DIAGNOSIS — R20.0 NUMBNESS IN BOTH HANDS: ICD-10-CM

## 2024-12-23 DIAGNOSIS — M79.641 BILATERAL HAND PAIN: ICD-10-CM

## 2024-12-23 DIAGNOSIS — M79.642 BILATERAL HAND PAIN: ICD-10-CM

## 2024-12-23 PROCEDURE — 97110 THERAPEUTIC EXERCISES: CPT | Mod: GO | Performed by: OCCUPATIONAL THERAPIST

## 2024-12-23 PROCEDURE — 97165 OT EVAL LOW COMPLEX 30 MIN: CPT | Mod: GO | Performed by: OCCUPATIONAL THERAPIST

## 2024-12-23 PROCEDURE — 97530 THERAPEUTIC ACTIVITIES: CPT | Mod: GO | Performed by: OCCUPATIONAL THERAPIST

## 2024-12-23 PROCEDURE — 97112 NEUROMUSCULAR REEDUCATION: CPT | Mod: GO | Performed by: OCCUPATIONAL THERAPIST

## 2024-12-23 NOTE — PROGRESS NOTES
"OCCUPATIONAL THERAPY EVALUATION  Type of Visit: Evaluation              Subjective        Presenting condition or subjective complaint: (Patient-Rptd) Bilateral hand pain  Date of onset: 11/20/24 (Referral)    Relevant medical history:     Dates & types of surgery:      Patient comes to therapy today for evaluation and treatment of numbness in both hands and bilateral hand pain.  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 As a youth he was involved in a Worker's compensation case for wrist tendinitis, unsure if that was ever settled though was terminated nonetheless. Did participate in hand therapy at that time with minimal improvement. He complains of aches, tightness, and pain in both wrists and hands, paraesthesias primarily in ring and small fingers on the right, though will occur bilaterally and in all digits. Recent electrodiagnostic studies, radiographs of the hand/wrist, and labs have been unremarkable for acute process. Symptoms are relatively constant, can occur even \"low intensity\" activity such as using a tooth brush, holding a paper or playing with his kids. Pain can linger for days or even weeks.     Per Adalberto Edward PA-C 11/9/2024:     Today we discussed the underlying etiology/pathology of patient's   1. Bilateral hand pain  R>L    2. Numbness in both hands       Discussed diagnosis and treatment options with the patient today. A shared decision making model was used. The patient's values and choices were respected. The following represents what was discussed and decided upon by the provider and the patient.   -We discussed patient's chronic bilateral upper extremity symptoms largely from the elbows down into the hands.  We discussed his previous work comp claims dating back to age 18-20 which was formally closed with no further follow-up  - We reviewed his x-rays of his hands today which show no " evidence of high-grade arthritis, fracture or other abnormality  - Physical exam today does not show any muscle weakness or bridger musculoskeletal reproduction of pain to palpation or tendon/muscle testing  - Patient does show some evidence of nerve irritation which potentially could be a combination of carpal tunnel and cubital tunnel syndrome.  - Patient will be sent for bilateral upper extremity EMGs.  I will contact the patient via telephone with results and treatment plan will be updated at that time  - Patient already is dealing with some sedation issues.  We discussed utilizing gabapentin which currently is being declined due to potential side effects and worsening of his fatigue.     Interpretation of electrodiagnostic studies 11/20/2024:     This is a normal study. There is no electrophysiologic evidence of a focal neuropathy or cervical radiculopathy affecting the upper limbs on the basis of this study.    Prior diagnostic imaging/testing results: (Patient-Rptd) X-ray; EMG     Prior therapy history for the same diagnosis, illness or injury: (Patient-Rptd) No      Prior Level of Function  Transfers: Independent  Ambulation: Independent  ADL: Independent  IADL: Driving, Finances, Housekeeping, Laundry, Meal preparation, Medication management, Work, Yard work    Living Environment  Social support: (Patient-Rptd) With a significant other or spouse   Type of home: (Patient-Rptd) House; 2-story; Basement   Stairs to enter the home: (Patient-Rptd) Yes (Patient-Rptd) 2 Is there a railing: (Patient-Rptd) No     Ramp: (Patient-Rptd) No   Stairs inside the home: (Patient-Rptd) Yes (Patient-Rptd) 32 Is there a railing: (Patient-Rptd) Yes     Help at home: (Patient-Rptd) None  Equipment owned:       Employment: (Patient-Rptd) Yes (Patient-Rptd)  - machining  Hobbies/Interests: (Patient-Rptd) Fishing    Patient goals for therapy: (Patient-Rptd) Daily tasks without pain     Objective   ADDITIONAL  HISTORY:  Right hand dominant  Patient reports symptoms of pain, stiffness/loss of motion, weakness/loss of strength, edema, numbness, and tingling   Transportation: drives  Currently working in normal job without restrictions    Functional Outcome Measure:   Upper Extremity Functional Index Score:  SCORE:   Column Totals: /80: (Patient-Rptd) 59   (A lower score indicates greater disability.)    PAIN:  Pain Level at Rest: 1/10  Pain Level with Use: 9/10  Pain Location: Diffuse to both hands and wrists.  More severe on the right, paresthesias more severe in the ring and small fingers typically.  Pain Quality: Aching, Numb, and Tingling  Pain Frequency: constant  Pain is Worst: daytime  Pain is Exacerbated By: Use of the hands, pattern indeterminate.  Pain is Relieved By: none  Pain Progression: Unchanged    POSTURE: Forward Neck Posture     EDEMA:   Finger/Thumb   (Circumference measured in cm) 12/23/2024   Index P1    PIP    P2    Long P1 8.0 cm to RUE, 7.6 cm to LUE (patient feels that edema is diffuse to both hands)   PIP    P2    Ring P1    PIP    P2    Small P1    PIP    P2    Thumb P1    IP      SENSATION: Primarily decreased in Ulnar Nerve distribution per pt report, though at times an include the entirety of the hand bilaterally.     SCREENS:  Defer shoulder and cervical screen today secondary to time constraints. Patient does complain of cervicogenic headaches.       ROM:  AROM of the bilateral elbow, FA, wrist thumb and digits are grossly WNL bilaterally. There is mild to moderate extrinsic extensor/flexor tightness bilaterally in the forearms, right more severe than left.     OBSERVATIONS/APPEARANCE:  No apparent thenar, hypothenar, or intrinsic atrophy bilaterally.       SPECIAL TESTS:   CTS Special Tests  Pain Report Left Right   Median Nerve Compression at Pronator Negative +   Carpal Compression Test-Durkan Test (30 sec) Negative Negative   Gaston Test for Lumbrical Incursion (fist x30 sec) Negative  "Negative   Tinel's at Carpal Tunnel Negative Negative   Phalen's Sign NT 2/2 wrist pain NT 2/2 wrist pain     Negative reproduction of expected symptoms with direct, deep palpation over the radial tunnel.    Ulnar Nerve Special Tests  Pain Report Left Right   Tinel's Cubital Tunnel Negative Negative   Tinel's Guyon's Canal Negative Negative   Elbow Flexion Test Negative Negative   Ulnar N Subluxation at Elbow Negative Negative   Froment's Test Negative Negative     Central Dorsal Zone Left Right   Finger Extension Test (SL--in wrist flex, resist long ext) Negative Negative   Almendarez Test (SL) Negative Negative   Linscheid Test (CMC joints--stabilize distal MC, mob CMC) NT NT   Ballottement Scaphoid on Lunate Negative Negative     NEURAL TENSION TESTING: MNT: Median Neurodynamic Test (based on DEEPTHI Malagon's ULNT)   12/23/2024   0-5 Scale 3+/5, \"tight\" bilaterally   Position:   0/5: Arm across abdomen in coronal plane  1/5: Depress shoulder, ER to neutral ABD shoulder to 45 degrees  2/5: ER shoulder to end range, keep elbow at 90 degrees  3/5: Extend elbow to 0 degrees  4/5: Fully supinate forearm  5/5: Extend wrist, fingers and thumb  Notes:  (+) indicates beyond grade level but less than FDC to next level  (-) indicates over FDC to level  S1 onset/change of patient's symptoms  S2 definite stop point based on patient's discomfort level    RESISTED TESTING:  Non-painful with resisted wrist flexion/extension, FA pronation/supination bilaterally, however with marked, non-painful crepitus on the left with resisted supination. 5/5 index FDP/FPL bilaterally.      STRENGTH:     Measured in pounds 12/23/2024 12/23/2024    Left Right   Trial 1 142# 142#     Lateral Pinch  Measured in pounds 12/23/2024 12/23/2024    Left Right   Trial 1 26# 27#     PALPATION:  Non-tender to palpation bilateral DRUJ.     Assessment & Plan   CLINICAL IMPRESSIONS  Medical Diagnosis: Numbness in both hands, pain in both hands    Treatment " Diagnosis: Numbness in both hands, pain in both hands    Impression/Assessment: Pt is a 44 year old male presenting to Occupational Therapy due to numbness of both hands, pain of both hands.  The following significant findings have been identified: Impaired activity tolerance, Impaired coordination, Impaired ROM, Impaired sensation, Impaired strength, and Pain.  These identified deficits interfere with their ability to perform self care tasks, work tasks, recreational activities, household chores, driving , medication management, financial management,  yard work, care of others, and meal planning and preparation as compared to previous level of function.   Patient's limitations or Problem List includes: Pain, Decreased ROM/motion, Increased edema, Weakness, Sensory disturbance, Hypomobility, Decreased , Decreased pinch, Decreased coordination, Decreased dexterity, and Tightness in musculature of the right shoulder, elbow, wrist, hand, and thumb which interferes with the patient's ability to perform Self Care Tasks (dressing, eating, bathing, hygiene/toileting), Work Tasks, Sleep Patterns, Recreational Activities, Household Chores, and Driving  as compared to previous level of function.    Clinical Decision Making (Complexity):  Assessment of Occupational Performance: 3-5 Performance Deficits  Occupational Performance Limitations: dressing, care of others, communication management, driving and community mobility, health management and maintenance, home establishment and management, meal preparation and cleanup, Baptism and spiritual activities and expression, shopping, sleep, work, leisure activities, and social participation  Clinical Decision Making (Complexity): Low complexity    PLAN OF CARE  Treatment Interventions:  Modalities:  US  Therapeutic Exercise:  AROM, AAROM, PROM, Tendon Gliding, Blocking, Reverse Blocking, Place and Hold, Contract Relax, Extensor Tracking, Isotonics, Isometrics, and  Stabilization  Neuromuscular re-education:  Nerve Gliding, Coordination/Dexterity, Sensory re-education, Desensitization, Kinesthetic Training, Proprioceptive Training, Posture, Kinesiotaping, Strain Counter Strain, Isometrics, and Stabilization  Manual Techniques:  Coordination/Dexterity, Joint mobilization, Friction massage, Myofascial release, and Manual edema mobilization  Orthotic Fabrication:  Static, Finger based, Hand based, and Forearm based  Self Care:  Self Care Tasks, Ergonomic Considerations, and Work Tasks    Long Term Goals   OT Goal 1  Goal Identifier: Goal I  Goal Description: Patient will complete required ADL, IADL, work and leisure tasks with mild or less pain and/or difficulty utilizing orthotics, nerve and soft tissue protection principles as well as ergonomic changes as needed.  (0-3/10)      Frequency of Treatment: 1x/week  Duration of Treatment: 8 weeks     Education Assessment: Learner/Method: Patient;No Barriers to Learning;Pictures/Video;Demonstration;Reading;Listening     Risks and benefits of evaluation/treatment have been explained.   Patient/Family/caregiver agrees with Plan of Care.     Evaluation Time:    OT Eval, Low Complexity Minutes (17482): 21    Signing Clinician: Manpreet Nieto OT

## 2025-01-21 DIAGNOSIS — E55.9 VITAMIN D DEFICIENCY: Primary | ICD-10-CM

## 2025-05-07 ENCOUNTER — OFFICE VISIT (OUTPATIENT)
Dept: ENDOCRINOLOGY | Facility: CLINIC | Age: 45
End: 2025-05-07
Attending: NURSE PRACTITIONER
Payer: COMMERCIAL

## 2025-05-07 VITALS
HEART RATE: 80 BPM | BODY MASS INDEX: 33.62 KG/M2 | TEMPERATURE: 98.1 F | RESPIRATION RATE: 18 BRPM | WEIGHT: 262 LBS | DIASTOLIC BLOOD PRESSURE: 87 MMHG | HEIGHT: 74 IN | SYSTOLIC BLOOD PRESSURE: 132 MMHG | OXYGEN SATURATION: 99 %

## 2025-05-07 DIAGNOSIS — R79.89 LOW TESTOSTERONE: ICD-10-CM

## 2025-05-07 DIAGNOSIS — Z12.5 SCREENING FOR PROSTATE CANCER: Primary | ICD-10-CM

## 2025-05-07 LAB — HGB BLD-MCNC: 15.1 G/DL (ref 13.3–17.7)

## 2025-05-07 PROCEDURE — G0103 PSA SCREENING: HCPCS | Performed by: INTERNAL MEDICINE

## 2025-05-07 PROCEDURE — 84270 ASSAY OF SEX HORMONE GLOBUL: CPT | Performed by: INTERNAL MEDICINE

## 2025-05-07 PROCEDURE — 83002 ASSAY OF GONADOTROPIN (LH): CPT | Performed by: INTERNAL MEDICINE

## 2025-05-07 PROCEDURE — 84146 ASSAY OF PROLACTIN: CPT | Performed by: INTERNAL MEDICINE

## 2025-05-07 PROCEDURE — 3075F SYST BP GE 130 - 139MM HG: CPT | Performed by: INTERNAL MEDICINE

## 2025-05-07 PROCEDURE — 84443 ASSAY THYROID STIM HORMONE: CPT | Performed by: INTERNAL MEDICINE

## 2025-05-07 PROCEDURE — 3079F DIAST BP 80-89 MM HG: CPT | Performed by: INTERNAL MEDICINE

## 2025-05-07 PROCEDURE — 86376 MICROSOMAL ANTIBODY EACH: CPT | Performed by: INTERNAL MEDICINE

## 2025-05-07 PROCEDURE — 36415 COLL VENOUS BLD VENIPUNCTURE: CPT | Performed by: INTERNAL MEDICINE

## 2025-05-07 PROCEDURE — G2211 COMPLEX E/M VISIT ADD ON: HCPCS | Performed by: INTERNAL MEDICINE

## 2025-05-07 PROCEDURE — 83001 ASSAY OF GONADOTROPIN (FSH): CPT | Performed by: INTERNAL MEDICINE

## 2025-05-07 PROCEDURE — 84439 ASSAY OF FREE THYROXINE: CPT | Performed by: INTERNAL MEDICINE

## 2025-05-07 PROCEDURE — 99204 OFFICE O/P NEW MOD 45 MIN: CPT | Performed by: INTERNAL MEDICINE

## 2025-05-07 PROCEDURE — 85018 HEMOGLOBIN: CPT | Performed by: INTERNAL MEDICINE

## 2025-05-07 NOTE — PATIENT INSTRUCTIONS
[facility]  Dr Edwards, Endocrinology Department    [place] [address] [id number]  [place] [address]  Appointment [phone]  Fax: [phone]  [place]: Monday - Thursday      Please check the cost coverage and copay with insurance before recommended tests, services and medications (especially if new medications are prescribed).     If ordered, please get blood work done 1 week prior to your next appointment so they will be available to Dr. Edwards at your visit.    Labs today.  Follow up in 4-5 weeks.

## 2025-05-07 NOTE — LETTER
5/7/2025      Adalberto Antonio  [address] [address] [place] [id number]      Dear Colleague,    Thank you for referring your patient, Adalberto Antonio, to the [facility]. Please see a copy of my visit note below.    Name: Adalberto Antonio  Seen in consultation with Tanisha Goyal CNP for Low Testosterone/hypogonadism. He also wants to discuss subclinical hypothyroidism.  HPI:  Adalberto Antonio is a 45 year old male who presents for the evaluation of low testosterone.   has a past medical history of COVID-19 virus infection (05/05/2021) and Nephrolithiasis.    Low testosterone:  11/2024-- was seen by PCP-- ml of low libido-- and testostone levels were checked showing low testosterone labs.  Was referred to endocrinology for further workup.  Never been on testosterone replacement.  + irritable.  + low energy   Wakes up at 4:30 AM and goes to work. Work hours are 5:45-4:30  + sleep apnea- on CPAP    2. Subclinical hypothyroidism:  Noted high TSH with normal FT4.  Not on medication.  Never been on medication for hypothyroidism.     Shave-no change  Hair Growth/Changes-no change  Muscle strength-decreased  OTC Herbal-No  Children-has 3 children  Erectile dysfunction-No  Decreased libido-low sex drive  Radiation Exposure, Mumps Orchitis, Cryptorchidism:No  H/o Torsions or Pelvic Trauma: NO  Previous use of testosterone: None  FH of prosate: No  DVT: No  One brother had testicular ca.  + on and off HA.  No vision problems.  Weight: lost 10 lbs- trying to loose wt.  Wt Readings from Last 2 Encounters:   05/07/25 118.8 kg (262 lb)   10/30/24 119.7 kg (264 lb)      Patient feels well at this time and denies any tachycardia, palpitations, heat intolerance, tremor, insomnia, diarrhea, or unexplained weight loss.  Patient also denies  cold intolerance, constipation, or unexplained weight gain.   PMH/PSH:  Past Medical History:   Diagnosis Date     COVID-19 virus infection 05/05/2021    Diagnosed  4/2021.          Nephrolithiasis      Past Surgical History:   Procedure Laterality Date     LITHOTRIPSY       Family Hx:  Family History   Problem Relation Age of Onset     No Known Problems Mother      Kidney Cancer Father      Heart Failure Maternal Grandmother      Dementia Maternal Grandfather      Cancer Paternal Grandmother        Social Hx:  Social History     Socioeconomic History     Marital status:      Spouse name: Not on file     Number of children: Not on file     Years of education: Not on file     Highest education level: Not on file   Occupational History     Not on file   Tobacco Use     Smoking status: Never     Passive exposure: Never     Smokeless tobacco: Never   Vaping Use     Vaping status: Never Used   Substance and Sexual Activity     Alcohol use: Yes     Comment: Alcoholic Drinks/day: rare     Drug use: No     Sexual activity: Yes     Partners: Female     Comment:    Other Topics Concern     Not on file   Social History Narrative     Not on file     Social Drivers of Health     Financial Resource Strain: Low Risk  (10/29/2024)    Financial Resource Strain      Within the past 12 months, have you or your family members you live with been unable to get utilities (heat, electricity) when it was really needed?: No   Food Insecurity: Low Risk  (10/29/2024)    Food Insecurity      Within the past 12 months, did you worry that your food would run out before you got money to buy more?: No      Within the past 12 months, did the food you bought just not last and you didn t have money to get more?: No   Transportation Needs: Low Risk  (10/29/2024)    Transportation Needs      Within the past 12 months, has lack of transportation kept you from medical appointments, getting your medicines, non-medical meetings or appointments, work, or from getting things that you need?: No   Physical Activity: Patient Declined (10/29/2024)    Exercise Vital Sign      Days of Exercise per Week: Patient  "declined      Minutes of Exercise per Session: Patient declined   Stress: Stress Concern Present (10/29/2024)    [facility] - Occupational Stress Questionnaire      Feeling of Stress : Rather much   Social Connections: Unknown (10/29/2024)    Social Connection and Isolation Panel [NHANES]      Frequency of Communication with Friends and Family: Not on file      Frequency of Social Gatherings with Friends and Family: Once a week      Attends Scientologist Services: Not on file      Active Member of Clubs or Organizations: Not on file      Attends Club or Organization Meetings: Not on file      Marital Status: Not on file   Interpersonal Safety: Low Risk  (12/23/2024)    Interpersonal Safety      Do you feel physically and emotionally safe where you currently live?: Yes      Within the past 12 months, have you been hit, slapped, kicked or otherwise physically hurt by someone?: No      Within the past 12 months, have you been humiliated or emotionally abused in other ways by your partner or ex-partner?: No   Housing Stability: Low Risk  (10/29/2024)    Housing Stability      Do you have housing? : Yes      Are you worried about losing your housing?: No          MEDICATIONS:  has a current medication list which includes the following prescription(s): famotidine and ibuprofen.    ROS     ROS: 10 point ROS neg other than the symptoms noted above in the HPI.    Physical Exam   VS: /87 (BP Location: Left arm, Patient Position: Chair, Cuff Size: Adult Large)   Pulse 80   Temp 98.1  F (36.7  C) (Tympanic)   Resp 18   Ht 1.88 m (6' 2.02\")   Wt 118.8 kg (262 lb)   SpO2 99%   BMI 33.62 kg/m    GENERAL: healthy, alert and no distress  EYES: Eyes grossly normal to inspection, conjunctivae and sclerae normal  ENT: no nose swelling, nasal discharge.  Thyroid: no apparent thyroid nodules.  Thyroid appears normal in size and nontender.  CV: RRR, no rubs, gallops, no murmurs  RESP: CTAB, no " wheezes, rales, or ronchi  ABDO: +BS  EXTREMITIES: no hand tremors.  NEURO: Cranial nerves grossly intact, mentation intact and speech normal  SKIN: No apparent skin lesions, rash or edema seen   PSYCH: mentation appears normal, affect normal/bright, judgement and insight intact, normal speech and appearance well-groomed      LABS:  TFTs:   Latest Ref Rng 3/28/2025  10:18 AM   ENDO THYROID LABS-[facility]     TSH 0.30 - 4.20 uIU/mL 6.90 (H)    FREE T4 0.90 - 1.70 ng/dL 1.18      Testosterone:  Component      Latest Ref Rng 10/30/2024  8:30 AM   Free Testosterone Calculated      ng/dL 5.98    Testosterone Total      240 - 950 ng/dL 239 (L)    Sex Hormone Binding Globulin      11 - 80 nmol/L 20        FSH/LH:    CBC:  Hemoglobin   Date Value Ref Range Status   05/07/2025 15.1 13.3 - 17.7 g/dL Final     PSA:      All pertinent notes, labs, and images personally reviewed by me.     A/P  Mr.David LUCY Antonio is a 45 year old here for the evaluation of hypogonadism.    1. Low Testosterone:  Pulsatile secretion of GnRH from the hypothalamus is required for both the initiation and maintenance of the reproductive axis.  GnRH stimulates the synthesis of LH and FSH.  FSH/LH  are responsible for testosterone production and spermatogenesis as well as systemic testosterone secretion and virilization.   Low testosterone X1  + Low energy level and low libido.  He has never been on testosterone replacement.  No history of CAD or DVT or prostate problem.  Plan:  Discussed hypogonadism in general.Discussed diagnosis, pathophysiology, management and treatment options of condition with pt.  Discussed primary versus secondary hypogonadism  Plan to check labs as noted below  Further workup chronic, possible MRI based on that.  Consider testosterone replacement based on that.  Tentative follow-up in 4-5 weeks.    2.Subclinical hypothyroidism:  Labs showing slightly high TSH and normal free T4  He has never been on thyroid hormone  replacement  Plan:  Discussed subclinical hypothyroidism in general.  I will plan to recheck labs and screen for Hashimoto's  Based on that consider further workup/hormone replacement.Discussed diagnosis, pathophysiology, management and treatment options of condition with pt.    Plan: PROSTATE SPEC ANTIGEN SCREEN  Plan: Prolactin, Testosterone Free and Total, T4         free, TSH, Thyroid peroxidase antibody,         Hemoglobin, Follicle stimulating hormone,         Luteinizing Hormone            Primary hypogonadism (Klinefelter's syndrome) is characterized by a low serum testosterone level/oligo- or azoospermia with elevated serum LH and FSH concentrations. Secondary hypogonadism is diagnosed in the setting of a low testosterone level and sperm count with  low or inappropriately normal serum LH and FSH concentrations.    Discussed indications, risks and benefits of all medications prescribed, and answered questions to patient's satisfaction.  The longitudinal plan of care for the diagnosis(es)/condition(s) as documented were addressed during this visit. Due to the added complexity in care, I will continue to support Adalberto in the subsequent management and with ongoing continuity of care.  All questions were answered.  The patient indicates understanding of the above issues and agrees with the plan set forth.      Follow-up:  As noted in AVS.    Xuan Edwards MD  Endocrinology   [facility]    CC: Tanisha Goyal     Again, thank you for allowing me to participate in the care of your patient.        Sincerely,        Xuan Edwards MD    Electronically signed

## 2025-05-07 NOTE — PROGRESS NOTES
Name: Adalberto Antonio  Seen in consultation with Tanisha Goyal CNP for Low Testosterone/hypogonadism. He also wants to discuss subclinical hypothyroidism.  HPI:  Adalberto Antonio is a 45 year old male who presents for the evaluation of low testosterone.   has a past medical history of COVID-19 virus infection (05/05/2021) and Nephrolithiasis.    Low testosterone:  11/2024-- was seen by PCP-- ml of low libido-- and testostone levels were checked showing low testosterone labs.  Was referred to endocrinology for further workup.  Never been on testosterone replacement.  + irritable.  + low energy   Wakes up at 4:30 AM and goes to work. Work hours are 5:45-4:30  + sleep apnea- on CPAP    2. Subclinical hypothyroidism:  Noted high TSH with normal FT4.  Not on medication.  Never been on medication for hypothyroidism.     Shave-no change  Hair Growth/Changes-no change  Muscle strength-decreased  OTC Herbal-No  Children-has 3 children  Erectile dysfunction-No  Decreased libido-low sex drive  Radiation Exposure, Mumps Orchitis, Cryptorchidism:No  H/o Torsions or Pelvic Trauma: NO  Previous use of testosterone: None  FH of prosate: No  DVT: No  One brother had testicular ca.  + on and off HA.  No vision problems.  Weight: lost 10 lbs- trying to loose wt.  Wt Readings from Last 2 Encounters:   05/07/25 118.8 kg (262 lb)   10/30/24 119.7 kg (264 lb)      Patient feels well at this time and denies any tachycardia, palpitations, heat intolerance, tremor, insomnia, diarrhea, or unexplained weight loss.  Patient also denies  cold intolerance, constipation, or unexplained weight gain.   PMH/PSH:  Past Medical History:   Diagnosis Date    COVID-19 virus infection 05/05/2021    Diagnosed 4/2021.         Nephrolithiasis      Past Surgical History:   Procedure Laterality Date    LITHOTRIPSY       Family Hx:  Family History   Problem Relation Age of Onset    No Known Problems Mother     Kidney Cancer Father     Heart Failure  Maternal Grandmother     Dementia Maternal Grandfather     Cancer Paternal Grandmother        Social Hx:  Social History     Socioeconomic History    Marital status:      Spouse name: Not on file    Number of children: Not on file    Years of education: Not on file    Highest education level: Not on file   Occupational History    Not on file   Tobacco Use    Smoking status: Never     Passive exposure: Never    Smokeless tobacco: Never   Vaping Use    Vaping status: Never Used   Substance and Sexual Activity    Alcohol use: Yes     Comment: Alcoholic Drinks/day: rare    Drug use: No    Sexual activity: Yes     Partners: Female     Comment:    Other Topics Concern    Not on file   Social History Narrative    Not on file     Social Drivers of Health     Financial Resource Strain: Low Risk  (10/29/2024)    Financial Resource Strain     Within the past 12 months, have you or your family members you live with been unable to get utilities (heat, electricity) when it was really needed?: No   Food Insecurity: Low Risk  (10/29/2024)    Food Insecurity     Within the past 12 months, did you worry that your food would run out before you got money to buy more?: No     Within the past 12 months, did the food you bought just not last and you didn t have money to get more?: No   Transportation Needs: Low Risk  (10/29/2024)    Transportation Needs     Within the past 12 months, has lack of transportation kept you from medical appointments, getting your medicines, non-medical meetings or appointments, work, or from getting things that you need?: No   Physical Activity: Patient Declined (10/29/2024)    Exercise Vital Sign     Days of Exercise per Week: Patient declined     Minutes of Exercise per Session: Patient declined   Stress: Stress Concern Present (10/29/2024)    [employer] - Occupational Stress Questionnaire     Feeling of Stress : Rather much   Social Connections: Unknown  "(10/29/2024)    Social Connection and Isolation Panel [NHANES]     Frequency of Communication with Friends and Family: Not on file     Frequency of Social Gatherings with Friends and Family: Once a week     Attends Rastafarian Services: Not on file     Active Member of Clubs or Organizations: Not on file     Attends Club or Organization Meetings: Not on file     Marital Status: Not on file   Interpersonal Safety: Low Risk  (12/23/2024)    Interpersonal Safety     Do you feel physically and emotionally safe where you currently live?: Yes     Within the past 12 months, have you been hit, slapped, kicked or otherwise physically hurt by someone?: No     Within the past 12 months, have you been humiliated or emotionally abused in other ways by your partner or ex-partner?: No   Housing Stability: Low Risk  (10/29/2024)    Housing Stability     Do you have housing? : Yes     Are you worried about losing your housing?: No          MEDICATIONS:  has a current medication list which includes the following prescription(s): famotidine and ibuprofen.    ROS     ROS: 10 point ROS neg other than the symptoms noted above in the HPI.    Physical Exam   VS: /87 (BP Location: Left arm, Patient Position: Chair, Cuff Size: Adult Large)   Pulse 80   Temp 98.1  F (36.7  C) (Tympanic)   Resp 18   Ht 1.88 m (6' 2.02\")   Wt 118.8 kg (262 lb)   SpO2 99%   BMI 33.62 kg/m    GENERAL: healthy, alert and no distress  EYES: Eyes grossly normal to inspection, conjunctivae and sclerae normal  ENT: no nose swelling, nasal discharge.  Thyroid: no apparent thyroid nodules.  Thyroid appears normal in size and nontender.  CV: RRR, no rubs, gallops, no murmurs  RESP: CTAB, no wheezes, rales, or ronchi  ABDO: +BS  EXTREMITIES: no hand tremors.  NEURO: Cranial nerves grossly intact, mentation intact and speech normal  SKIN: No apparent skin lesions, rash or edema seen   PSYCH: mentation appears normal, affect normal/bright, judgement and insight " intact, normal speech and appearance well-groomed      LABS:  TFTs:   Latest Ref Rng 3/28/2025  10:18 AM   ENDO THYROID LABS-[facility]     TSH 0.30 - 4.20 uIU/mL 6.90 (H)    FREE T4 0.90 - 1.70 ng/dL 1.18      Testosterone:  Component      Latest Ref Rng 10/30/2024  8:30 AM   Free Testosterone Calculated      ng/dL 5.98    Testosterone Total      240 - 950 ng/dL 239 (L)    Sex Hormone Binding Globulin      11 - 80 nmol/L 20        FSH/LH:    CBC:  Hemoglobin   Date Value Ref Range Status   05/07/2025 15.1 13.3 - 17.7 g/dL Final     PSA:      All pertinent notes, labs, and images personally reviewed by me.     A/P  Mr.David LUCY Antonio is a 45 year old here for the evaluation of hypogonadism.    1. Low Testosterone:  Pulsatile secretion of GnRH from the hypothalamus is required for both the initiation and maintenance of the reproductive axis.  GnRH stimulates the synthesis of LH and FSH.  FSH/LH  are responsible for testosterone production and spermatogenesis as well as systemic testosterone secretion and virilization.   Low testosterone X1  + Low energy level and low libido.  He has never been on testosterone replacement.  No history of CAD or DVT or prostate problem.  Plan:  Discussed hypogonadism in general.Discussed diagnosis, pathophysiology, management and treatment options of condition with pt.  Discussed primary versus secondary hypogonadism  Plan to check labs as noted below  Further workup chronic, possible MRI based on that.  Consider testosterone replacement based on that.  Tentative follow-up in 4-5 weeks.    2.Subclinical hypothyroidism:  Labs showing slightly high TSH and normal free T4  He has never been on thyroid hormone replacement  Plan:  Discussed subclinical hypothyroidism in general.  I will plan to recheck labs and screen for Hashimoto's  Based on that consider further workup/hormone replacement.Discussed diagnosis, pathophysiology, management and treatment options of condition with pt.    Plan:  PROSTATE SPEC ANTIGEN SCREEN  Plan: Prolactin, Testosterone Free and Total, T4         free, TSH, Thyroid peroxidase antibody,         Hemoglobin, Follicle stimulating hormone,         Luteinizing Hormone            Primary hypogonadism (Klinefelter's syndrome) is characterized by a low serum testosterone level/oligo- or azoospermia with elevated serum LH and FSH concentrations. Secondary hypogonadism is diagnosed in the setting of a low testosterone level and sperm count with  low or inappropriately normal serum LH and FSH concentrations.    Discussed indications, risks and benefits of all medications prescribed, and answered questions to patient's satisfaction.  The longitudinal plan of care for the diagnosis(es)/condition(s) as documented were addressed during this visit. Due to the added complexity in care, I will continue to support Adalberto in the subsequent management and with ongoing continuity of care.  All questions were answered.  The patient indicates understanding of the above issues and agrees with the plan set forth.      Follow-up:  As noted in AVS.    Xuan Edwards MD  Endocrinology   [facility]    CC: Tanisha Goyal

## 2025-05-08 ENCOUNTER — RESULTS FOLLOW-UP (OUTPATIENT)
Dept: ENDOCRINOLOGY | Facility: CLINIC | Age: 45
End: 2025-05-08

## 2025-05-08 LAB
FSH SERPL IRP2-ACNC: 2.7 MIU/ML (ref 1.5–12.4)
LH SERPL-ACNC: 1.3 MIU/ML (ref 1.7–8.6)
PROLACTIN SERPL 3RD IS-MCNC: 9 NG/ML (ref 4–15)
PSA SERPL DL<=0.01 NG/ML-MCNC: 0.29 NG/ML (ref 0–2.5)
SHBG SERPL-SCNC: 19 NMOL/L (ref 11–80)
T4 FREE SERPL-MCNC: 1.31 NG/DL (ref 0.9–1.7)
THYROPEROXIDASE AB SERPL-ACNC: 381 IU/ML
TSH SERPL DL<=0.005 MIU/L-ACNC: 5.86 UIU/ML (ref 0.3–4.2)

## 2025-05-12 LAB
TESTOST FREE SERPL-MCNC: 3.86 NG/DL
TESTOST SERPL-MCNC: 155 NG/DL (ref 240–950)

## 2025-06-09 ENCOUNTER — VIRTUAL VISIT (OUTPATIENT)
Dept: ENDOCRINOLOGY | Facility: CLINIC | Age: 45
End: 2025-06-09
Payer: COMMERCIAL

## 2025-06-09 VITALS — HEIGHT: 74 IN | WEIGHT: 257 LBS | BODY MASS INDEX: 32.98 KG/M2

## 2025-06-09 DIAGNOSIS — R79.89 LOW TESTOSTERONE IN MALE: Primary | ICD-10-CM

## 2025-06-09 PROCEDURE — 1126F AMNT PAIN NOTED NONE PRSNT: CPT | Mod: 95 | Performed by: INTERNAL MEDICINE

## 2025-06-09 PROCEDURE — 98006 SYNCH AUDIO-VIDEO EST MOD 30: CPT | Performed by: INTERNAL MEDICINE

## 2025-06-09 PROCEDURE — G2211 COMPLEX E/M VISIT ADD ON: HCPCS | Performed by: INTERNAL MEDICINE

## 2025-06-09 RX ORDER — LEVOTHYROXINE SODIUM 25 UG/1
25 TABLET ORAL
Qty: 90 TABLET | Refills: 2 | Status: SHIPPED | OUTPATIENT
Start: 2025-06-09

## 2025-06-09 ASSESSMENT — PAIN SCALES - GENERAL: PAINLEVEL_OUTOF10: NO PAIN (0)

## 2025-06-09 NOTE — NURSING NOTE
Current patient location: [address] [place] [id number]    Is the patient currently in the state of [place]? YES    Visit mode: VIDEO    If the visit is dropped, the patient can be reconnected by:VIDEO VISIT: Text to cell phone:   Telephone Information:   Mobile [phone]       Will anyone else be joining the visit? NO  (If patient encounters technical issues they should call [phone]    Are changes needed to the allergy or medication list? No    Are refills needed on medications prescribed by this physician? NO    Rooming Documentation:  Questionnaire(s) completed    Reason for visit: RECHECK    Dianelys TELLOF

## 2025-06-09 NOTE — PROGRESS NOTES
"THIS IS A VIDEO VISIT:    Phone call visit/virtual visit encounter:    Name of patient: Adalberto Antonio    Date of encounter: 6/9/2025    Time of start of video visit: 2:31    Video started: 2:38    Video ended: 2:49    Provider location: off site/ [facility]    Patient location: patients home.    Mode of transmission: [employer] video/ [employer]    Verbal consent: obtained before starting visit. Pt is agreeable.      The patient has been notified of following:      \"This VIDEO visit will be conducted via a call between you and your physician/provider. We have found that certain health care needs can be provided without the need for a physical exam.  This service lets us provide the care you need with a short phone conversation.  If a prescription is necessary we can send it directly to your pharmacy.  If lab work is needed we can place an order for that and you can then stop by our lab to have the test done at a later time.     With new updates with corona virus patient might be billed as clinic visit.     If during the course of the call the physician/provider feels a telephone visit is not appropriate, you will not be charged for this service.\"      Past medical history, social history, family history, allergy and medications were reviewed and updated as appropriate.  Reviewed pertinent labs, notes, imaging studies personally.    Name: Adalberto Antonio  Seen in consultation with Tanisha Goyal CNP for Low Testosterone/hypogonadism. He also wants to discuss subclinical hypothyroidism.  HPI:  Adalebrto Antonio is a 45 year old male who presents for the evaluation of low testosterone.   has a past medical history of COVID-19 virus infection (05/05/2021) and Nephrolithiasis.    Low testosterone:  11/2024-- was seen by PCP-- cc of low libido-- and testostone levels were checked showing low testosterone labs.( Total testosterone 239)  Was referred to endocrinology for further workup.  Rechecked labs 5/2025- " total testosterone 155. FSH and LH on low normal side.  Normal prolactin.  Thyroid labs- subclinical hypothyroidism as noted below.  Never been on testosterone replacement.  + irritable.  + low energy   Wakes up at 4:30 AM and goes to work. Work hours are 5:45-4:30  + sleep apnea- on CPAP    2. Subclinical hypothyroidism(+TPO):  Noted high TSH with normal FT4.  Not on medication.  Never been on medication for hypothyroidism.  Currently not on thyroid hormone replacement.    + lost sense of smell X 2.5 years. Can smell some things but can't smell other things.eg: can smell perfumes and coffee.     Shave-no change  Hair Growth/Changes-no change  Muscle strength-decreased  OTC Herbal-No  Children-has 3 children  Erectile dysfunction-No  Decreased libido-low sex drive  Radiation Exposure, Mumps Orchitis, Cryptorchidism:No  H/o Torsions or Pelvic Trauma: NO  Previous use of testosterone: None  FH of prosate: No  DVT: No  One brother had testicular ca.  + on and off HA.  No vision problems.  Weight: mostly stable.  Wt Readings from Last 2 Encounters:   06/09/25 116.6 kg (257 lb)   05/07/25 118.8 kg (262 lb)      Patient feels well at this time and denies any tachycardia, palpitations, heat intolerance, tremor, insomnia, diarrhea, or unexplained weight loss.  Patient also denies  cold intolerance, constipation, or unexplained weight gain.   PMH/PSH:  Past Medical History:   Diagnosis Date    COVID-19 virus infection 05/05/2021    Diagnosed 4/2021.         Nephrolithiasis      Past Surgical History:   Procedure Laterality Date    LITHOTRIPSY       Family Hx:  Family History   Problem Relation Age of Onset    No Known Problems Mother     Kidney Cancer Father     Heart Failure Maternal Grandmother     Dementia Maternal Grandfather     Cancer Paternal Grandmother        Social Hx:  Social History     Socioeconomic History    Marital status:      Spouse name: Not on file    Number of children: Not on file    Years of  education: Not on file    Highest education level: Not on file   Occupational History    Not on file   Tobacco Use    Smoking status: Never     Passive exposure: Never    Smokeless tobacco: Never   Vaping Use    Vaping status: Never Used   Substance and Sexual Activity    Alcohol use: Yes     Comment: Alcoholic Drinks/day: rare    Drug use: No    Sexual activity: Yes     Partners: Female     Comment:    Other Topics Concern    Not on file   Social History Narrative    Not on file     Social Drivers of Health     Financial Resource Strain: Low Risk  (10/29/2024)    Financial Resource Strain     Within the past 12 months, have you or your family members you live with been unable to get utilities (heat, electricity) when it was really needed?: No   Food Insecurity: Low Risk  (10/29/2024)    Food Insecurity     Within the past 12 months, did you worry that your food would run out before you got money to buy more?: No     Within the past 12 months, did the food you bought just not last and you didn t have money to get more?: No   Transportation Needs: Low Risk  (10/29/2024)    Transportation Needs     Within the past 12 months, has lack of transportation kept you from medical appointments, getting your medicines, non-medical meetings or appointments, work, or from getting things that you need?: No   Physical Activity: Patient Declined (10/29/2024)    Exercise Vital Sign     Days of Exercise per Week: Patient declined     Minutes of Exercise per Session: Patient declined   Stress: Stress Concern Present (10/29/2024)    [employer] - Occupational Stress Questionnaire     Feeling of Stress : Rather much   Social Connections: Unknown (10/29/2024)    Social Connection and Isolation Panel [NHANES]     Frequency of Communication with Friends and Family: Not on file     Frequency of Social Gatherings with Friends and Family: Once a week     Attends Judaism Services: Not on file     Active  "Member of Clubs or Organizations: Not on file     Attends Club or Organization Meetings: Not on file     Marital Status: Not on file   Interpersonal Safety: Low Risk  (12/23/2024)    Interpersonal Safety     Do you feel physically and emotionally safe where you currently live?: Yes     Within the past 12 months, have you been hit, slapped, kicked or otherwise physically hurt by someone?: No     Within the past 12 months, have you been humiliated or emotionally abused in other ways by your partner or ex-partner?: No   Housing Stability: Low Risk  (10/29/2024)    Housing Stability     Do you have housing? : Yes     Are you worried about losing your housing?: No          MEDICATIONS:  has a current medication list which includes the following prescription(s): famotidine and ibuprofen.    ROS     ROS: 10 point ROS neg other than the symptoms noted above in the HPI.    Physical Exam   VS: Ht 1.88 m (6' 2\")   Wt 116.6 kg (257 lb)   BMI 33.00 kg/m    GENERAL: healthy, alert and no distress  EYES: Eyes grossly normal to inspection, conjunctivae and sclerae normal  ENT: no nose swelling, nasal discharge.  Thyroid: no apparent thyroid nodules  RESP: no audible wheeze, cough, or visible cyanosis.  No visible retractions or increased work of breathing.  Able to speak fully in complete sentences.  ABDO: not evaluated.  EXTREMITIES: no hand tremors.  NEURO: Cranial nerves grossly intact, mentation intact and speech normal  SKIN: No apparent skin lesions, rash or edema seen   PSYCH: mentation appears normal, affect normal/bright, judgement and insight intact, normal speech and appearance well-groomed      LABS:  TFTs:   Latest Ref Rng 5/7/2025  10:21 AM   ENDO THYROID LABS[facility]     TSH 0.30 - 4.20 uIU/mL 5.86 (H)    FREE T4 0.90 - 1.70 ng/dL 1.31    Thyroid Peroxidase Antibody <35 IU/mL 381 (H)    Sex Hormone Binding Globulin 11 - 80 nmol/L 19       Testosterone:  Component      Latest Ref Rng 10/30/2024  8:30 AM   Free " Testosterone Calculated      ng/dL 5.98    Testosterone Total      240 - 950 ng/dL 239 (L)    Sex Hormone Binding Globulin      11 - 80 nmol/L 20        FSH/LH:  Component      Latest Ref Rng 5/7/2025  10:21 AM   FSH      1.5 - 12.4 mIU/mL 2.7    Luteinizing Hormone      1.7 - 8.6 mIU/mL 1.3 (L)      CBC:  Hemoglobin   Date Value Ref Range Status   05/07/2025 15.1 13.3 - 17.7 g/dL Final     PSA:      All pertinent notes, labs, and images personally reviewed by me.     A/P  Mr.David LUCY Antonio is a 45 year old here for the evaluation of hypogonadism.    1. Low Testosterone:  Pulsatile secretion of GnRH from the hypothalamus is required for both the initiation and maintenance of the reproductive axis.  GnRH stimulates the synthesis of LH and FSH.  FSH/LH  are responsible for testosterone production and spermatogenesis as well as systemic testosterone secretion and virilization.   Low testosterone X 2.  Noted low normal FSH and LH--concern for secondary hypogonadism.  Normal prolactin levels.  + Low energy level and low libido.  He has never been on testosterone replacement.  No history of CAD or DVT or prostate problem.  Plan:  Discussed hypogonadism in general.Discussed diagnosis, pathophysiology, management and treatment options of condition with pt.  Discussed primary versus secondary hypogonadism  In the setting of low normal FSH and LH, recommend MRI brain to rule out pituitary etiology.  Recheck labs in 8 weeks as noted below   Consider testosterone replacement based on that.      2.Subclinical hypothyroidism (TPO positive):  Labs showing slightly high TSH and normal free T4  He has never been on thyroid hormone replacement  Plan:  Discussed subclinical hypothyroidism in general.  Based on labs consistent with subclinical hypothyroidism and positive TPO antibodies, recommend to start levothyroxine 25 mcg/day  Labs in 8 weeks  Follow-up after that.    Plan: MR Brain w/o & w Contrast, levothyroxine          (SYNTHROID/LEVOTHROID) 25 MCG tablet, TSH, T4         free, Testosterone Free and Total          Primary hypogonadism (Klinefelter's syndrome) is characterized by a low serum testosterone level/oligo- or azoospermia with elevated serum LH and FSH concentrations. Secondary hypogonadism is diagnosed in the setting of a low testosterone level and sperm count with  low or inappropriately normal serum LH and FSH concentrations.    Discussed indications, risks and benefits of all medications prescribed, and answered questions to patient's satisfaction.  The longitudinal plan of care for the diagnosis(es)/condition(s) as documented were addressed during this visit. Due to the added complexity in care, I will continue to support Adalberto in the subsequent management and with ongoing continuity of care.  All questions were answered.  The patient indicates understanding of the above issues and agrees with the plan set forth.      Follow-up:  As noted in AVS.    Xuan Edwards MD  Endocrinology   [facility]    CC: Tanisha Goyal

## 2025-06-09 NOTE — LETTER
"6/9/2025      Adalberto Antonio  [address] [address] [place] [id number]      Dear Colleague,    Thank you for referring your patient, Adalberto Antonio, to the [facility]. Please see a copy of my visit note below.    THIS IS A VIDEO VISIT:    Phone call visit/virtual visit encounter:    Name of patient: Adalberto Antonio    Date of encounter: 6/9/2025    Time of start of video visit: 2:31    Video started: 2:38    Video ended: 2:49    Provider location: off site/ [facility]    Patient location: patients home.    Mode of transmission: [employer] video/ [employer]    Verbal consent: obtained before starting visit. Pt is agreeable.      The patient has been notified of following:      \"This VIDEO visit will be conducted via a call between you and your physician/provider. We have found that certain health care needs can be provided without the need for a physical exam.  This service lets us provide the care you need with a short phone conversation.  If a prescription is necessary we can send it directly to your pharmacy.  If lab work is needed we can place an order for that and you can then stop by our lab to have the test done at a later time.     With new updates with corona virus patient might be billed as clinic visit.     If during the course of the call the physician/provider feels a telephone visit is not appropriate, you will not be charged for this service.\"      Past medical history, social history, family history, allergy and medications were reviewed and updated as appropriate.  Reviewed pertinent labs, notes, imaging studies personally.    Name: Adalberto Antonio  Seen in consultation with Tanisha Goyal CNP for Low Testosterone/hypogonadism. He also wants to discuss subclinical hypothyroidism.  HPI:  Adalberto Antonio is a 45 year old male who presents for the evaluation of low testosterone.   has a past medical history of COVID-19 virus infection (05/05/2021) and " Nephrolithiasis.    Low testosterone:  11/2024-- was seen by PCP-- cc of low libido-- and testostone levels were checked showing low testosterone labs.( Total testosterone 239)  Was referred to endocrinology for further workup.  Rechecked labs 5/2025- total testosterone 155. FSH and LH on low normal side.  Normal prolactin.  Thyroid labs- subclinical hypothyroidism as noted below.  Never been on testosterone replacement.  + irritable.  + low energy   Wakes up at 4:30 AM and goes to work. Work hours are 5:45-4:30  + sleep apnea- on CPAP    2. Subclinical hypothyroidism(+TPO):  Noted high TSH with normal FT4.  Not on medication.  Never been on medication for hypothyroidism.  Currently not on thyroid hormone replacement.    + lost sense of smell X 2.5 years. Can smell some things but can't smell other things.eg: can smell perfumes and coffee.     Shave-no change  Hair Growth/Changes-no change  Muscle strength-decreased  OTC Herbal-No  Children-has 3 children  Erectile dysfunction-No  Decreased libido-low sex drive  Radiation Exposure, Mumps Orchitis, Cryptorchidism:No  H/o Torsions or Pelvic Trauma: NO  Previous use of testosterone: None  FH of prosate: No  DVT: No  One brother had testicular ca.  + on and off HA.  No vision problems.  Weight: mostly stable.  Wt Readings from Last 2 Encounters:   06/09/25 116.6 kg (257 lb)   05/07/25 118.8 kg (262 lb)      Patient feels well at this time and denies any tachycardia, palpitations, heat intolerance, tremor, insomnia, diarrhea, or unexplained weight loss.  Patient also denies  cold intolerance, constipation, or unexplained weight gain.   PMH/PSH:  Past Medical History:   Diagnosis Date     COVID-19 virus infection 05/05/2021    Diagnosed 4/2021.          Nephrolithiasis      Past Surgical History:   Procedure Laterality Date     LITHOTRIPSY       Family Hx:  Family History   Problem Relation Age of Onset     No Known Problems Mother      Kidney Cancer Father      Heart  Failure Maternal Grandmother      Dementia Maternal Grandfather      Cancer Paternal Grandmother        Social Hx:  Social History     Socioeconomic History     Marital status:      Spouse name: Not on file     Number of children: Not on file     Years of education: Not on file     Highest education level: Not on file   Occupational History     Not on file   Tobacco Use     Smoking status: Never     Passive exposure: Never     Smokeless tobacco: Never   Vaping Use     Vaping status: Never Used   Substance and Sexual Activity     Alcohol use: Yes     Comment: Alcoholic Drinks/day: rare     Drug use: No     Sexual activity: Yes     Partners: Female     Comment:    Other Topics Concern     Not on file   Social History Narrative     Not on file     Social Drivers of Health     Financial Resource Strain: Low Risk  (10/29/2024)    Financial Resource Strain      Within the past 12 months, have you or your family members you live with been unable to get utilities (heat, electricity) when it was really needed?: No   Food Insecurity: Low Risk  (10/29/2024)    Food Insecurity      Within the past 12 months, did you worry that your food would run out before you got money to buy more?: No      Within the past 12 months, did the food you bought just not last and you didn t have money to get more?: No   Transportation Needs: Low Risk  (10/29/2024)    Transportation Needs      Within the past 12 months, has lack of transportation kept you from medical appointments, getting your medicines, non-medical meetings or appointments, work, or from getting things that you need?: No   Physical Activity: Patient Declined (10/29/2024)    Exercise Vital Sign      Days of Exercise per Week: Patient declined      Minutes of Exercise per Session: Patient declined   Stress: Stress Concern Present (10/29/2024)    [employer] [place] [employer] - Occupational Stress Questionnaire      Feeling of Stress : Rather much   Social  "Connections: Unknown (10/29/2024)    Social Connection and Isolation Panel [NHANES]      Frequency of Communication with Friends and Family: Not on file      Frequency of Social Gatherings with Friends and Family: Once a week      Attends Yarsanism Services: Not on file      Active Member of Clubs or Organizations: Not on file      Attends Club or Organization Meetings: Not on file      Marital Status: Not on file   Interpersonal Safety: Low Risk  (12/23/2024)    Interpersonal Safety      Do you feel physically and emotionally safe where you currently live?: Yes      Within the past 12 months, have you been hit, slapped, kicked or otherwise physically hurt by someone?: No      Within the past 12 months, have you been humiliated or emotionally abused in other ways by your partner or ex-partner?: No   Housing Stability: Low Risk  (10/29/2024)    Housing Stability      Do you have housing? : Yes      Are you worried about losing your housing?: No          MEDICATIONS:  has a current medication list which includes the following prescription(s): famotidine and ibuprofen.    ROS     ROS: 10 point ROS neg other than the symptoms noted above in the HPI.    Physical Exam   VS: Ht 1.88 m (6' 2\")   Wt 116.6 kg (257 lb)   BMI 33.00 kg/m    GENERAL: healthy, alert and no distress  EYES: Eyes grossly normal to inspection, conjunctivae and sclerae normal  ENT: no nose swelling, nasal discharge.  Thyroid: no apparent thyroid nodules  RESP: no audible wheeze, cough, or visible cyanosis.  No visible retractions or increased work of breathing.  Able to speak fully in complete sentences.  ABDO: not evaluated.  EXTREMITIES: no hand tremors.  NEURO: Cranial nerves grossly intact, mentation intact and speech normal  SKIN: No apparent skin lesions, rash or edema seen   PSYCH: mentation appears normal, affect normal/bright, judgement and insight intact, normal speech and appearance well-groomed      LABS:  TFTs:   Latest Ref Rng " 5/7/2025  10:21 AM   ENDO THYROID LABS-[facility]     TSH 0.30 - 4.20 uIU/mL 5.86 (H)    FREE T4 0.90 - 1.70 ng/dL 1.31    Thyroid Peroxidase Antibody <35 IU/mL 381 (H)    Sex Hormone Binding Globulin 11 - 80 nmol/L 19       Testosterone:  Component      Latest Ref Rng 10/30/2024  8:30 AM   Free Testosterone Calculated      ng/dL 5.98    Testosterone Total      240 - 950 ng/dL 239 (L)    Sex Hormone Binding Globulin      11 - 80 nmol/L 20        FSH/LH:  Component      Latest Ref Rng 5/7/2025  10:21 AM   FSH      1.5 - 12.4 mIU/mL 2.7    Luteinizing Hormone      1.7 - 8.6 mIU/mL 1.3 (L)      CBC:  Hemoglobin   Date Value Ref Range Status   05/07/2025 15.1 13.3 - 17.7 g/dL Final     PSA:      All pertinent notes, labs, and images personally reviewed by me.     A/P  Mr.David LUCY Antonio is a 45 year old here for the evaluation of hypogonadism.    1. Low Testosterone:  Pulsatile secretion of GnRH from the hypothalamus is required for both the initiation and maintenance of the reproductive axis.  GnRH stimulates the synthesis of LH and FSH.  FSH/LH  are responsible for testosterone production and spermatogenesis as well as systemic testosterone secretion and virilization.   Low testosterone X 2.  Noted low normal FSH and LH--concern for secondary hypogonadism.  Normal prolactin levels.  + Low energy level and low libido.  He has never been on testosterone replacement.  No history of CAD or DVT or prostate problem.  Plan:  Discussed hypogonadism in general.Discussed diagnosis, pathophysiology, management and treatment options of condition with pt.  Discussed primary versus secondary hypogonadism  In the setting of low normal FSH and LH, recommend MRI brain to rule out pituitary etiology.  Recheck labs in 8 weeks as noted below   Consider testosterone replacement based on that.      2.Subclinical hypothyroidism (TPO positive):  Labs showing slightly high TSH and normal free T4  He has never been on thyroid hormone  replacement  Plan:  Discussed subclinical hypothyroidism in general.  Based on labs consistent with subclinical hypothyroidism and positive TPO antibodies, recommend to start levothyroxine 25 mcg/day  Labs in 8 weeks  Follow-up after that.    Plan: MR Brain w/o & w Contrast, levothyroxine         (SYNTHROID/LEVOTHROID) 25 MCG tablet, TSH, T4         free, Testosterone Free and Total          Primary hypogonadism (Klinefelter's syndrome) is characterized by a low serum testosterone level/oligo- or azoospermia with elevated serum LH and FSH concentrations. Secondary hypogonadism is diagnosed in the setting of a low testosterone level and sperm count with  low or inappropriately normal serum LH and FSH concentrations.    Discussed indications, risks and benefits of all medications prescribed, and answered questions to patient's satisfaction.  The longitudinal plan of care for the diagnosis(es)/condition(s) as documented were addressed during this visit. Due to the added complexity in care, I will continue to support Adalberto in the subsequent management and with ongoing continuity of care.  All questions were answered.  The patient indicates understanding of the above issues and agrees with the plan set forth.      Follow-up:  As noted in AVS.    Xuan Edwards MD  Endocrinology   [facility]    CC: Tanisha Goyal     Again, thank you for allowing me to participate in the care of your patient.        Sincerely,        Xuan Edwards MD    Electronically signed

## 2025-06-09 NOTE — PATIENT INSTRUCTIONS
[facility]  Dr Edwards, Endocrinology Department    [place] [address] [id number]  [place] [address]  Appointment [phone]  Fax: [phone]  [place]: Monday - Thursday      Start levothyroxine 25 mcg/day.  MRI brain needed.  Labs and follow up in 8 weeks. (Fasting morning labs)  Please make a lab appointment for blood work and follow up clinic appointment in 1 week after that to discuss results.    Take Levothyroxine on an empty stomach. Take it with a full glass of water at least 30 minutes to 1 hour before eating breakfast.   This medicine should be taken at least 4 hours before or 4 hours after these medicines: antacids (Maalox , Mylanta , Tums ), calcium supplements, cholestyramine (Prevalite , Questran ), colestipol (Colestid ), iron supplements, orlistat (Gabriel , Xenical ), simethicone (Gas-X , Mylicon ), and sucralfate (Carafate ).   Swallow the capsule whole. Do not cut or crush it.     Take Levothyroxine on an empty stomach. Take it with a full glass of water at least 30 minutes to 1 hour before eating breakfast.   This medicine should be taken at least 4 hours before or 4 hours after these medicines: antacids (Maalox , Mylanta , Tums ), calcium supplements, cholestyramine (Prevalite , Questran ), colestipol (Colestid ), iron supplements, orlistat (Gabriel , Xenical ), simethicone (Gas-X , Mylicon ), and sucralfate (Carafate ).   Swallow the capsule whole. Do not cut or crush it.

## 2025-06-23 ENCOUNTER — HOSPITAL ENCOUNTER (OUTPATIENT)
Dept: MRI IMAGING | Facility: CLINIC | Age: 45
Discharge: HOME | End: 2025-06-23
Attending: INTERNAL MEDICINE | Admitting: INTERNAL MEDICINE
Payer: COMMERCIAL

## 2025-06-23 DIAGNOSIS — R79.89 LOW TESTOSTERONE IN MALE: ICD-10-CM

## 2025-06-23 PROCEDURE — A9585 GADOBUTROL INJECTION: HCPCS | Performed by: INTERNAL MEDICINE

## 2025-06-23 PROCEDURE — 70553 MRI BRAIN STEM W/O & W/DYE: CPT

## 2025-06-23 PROCEDURE — 255N000002 HC RX 255 OP 636: Performed by: INTERNAL MEDICINE

## 2025-06-23 RX ORDER — GADOBUTROL 604.72 MG/ML
10 INJECTION INTRAVENOUS ONCE
Status: COMPLETED | OUTPATIENT
Start: 2025-06-23 | End: 2025-06-23

## 2025-06-23 RX ADMIN — GADOBUTROL 10 ML: 604.72 INJECTION INTRAVENOUS at 18:26

## 2025-06-25 ENCOUNTER — RESULTS FOLLOW-UP (OUTPATIENT)
Dept: ENDOCRINOLOGY | Facility: CLINIC | Age: 45
End: 2025-06-25

## 2025-07-30 ENCOUNTER — LAB (OUTPATIENT)
Dept: LAB | Facility: CLINIC | Age: 45
End: 2025-07-30
Payer: COMMERCIAL

## 2025-07-30 DIAGNOSIS — R73.03 PREDIABETES: ICD-10-CM

## 2025-07-30 DIAGNOSIS — R79.89 LOW TESTOSTERONE IN MALE: ICD-10-CM

## 2025-07-30 LAB
EST. AVERAGE GLUCOSE BLD GHB EST-MCNC: 103 MG/DL
HBA1C MFR BLD: 5.2 % (ref 0–5.6)

## 2025-07-30 PROCEDURE — 84439 ASSAY OF FREE THYROXINE: CPT

## 2025-07-30 PROCEDURE — 83036 HEMOGLOBIN GLYCOSYLATED A1C: CPT

## 2025-07-30 PROCEDURE — 84270 ASSAY OF SEX HORMONE GLOBUL: CPT

## 2025-07-30 PROCEDURE — 84443 ASSAY THYROID STIM HORMONE: CPT

## 2025-07-30 PROCEDURE — 36415 COLL VENOUS BLD VENIPUNCTURE: CPT

## 2025-07-31 LAB
SHBG SERPL-SCNC: 22 NMOL/L (ref 11–80)
T4 FREE SERPL-MCNC: 1.44 NG/DL (ref 0.9–1.7)
TSH SERPL DL<=0.005 MIU/L-ACNC: 4.5 UIU/ML (ref 0.3–4.2)

## 2025-08-05 ENCOUNTER — VIRTUAL VISIT (OUTPATIENT)
Dept: ENDOCRINOLOGY | Facility: CLINIC | Age: 45
End: 2025-08-05
Payer: COMMERCIAL

## 2025-08-05 DIAGNOSIS — R79.89 LOW TESTOSTERONE IN MALE: Primary | ICD-10-CM

## 2025-08-05 DIAGNOSIS — E06.3 HYPOTHYROIDISM DUE TO HASHIMOTO THYROIDITIS: ICD-10-CM

## 2025-08-05 DIAGNOSIS — Z12.5 SCREENING FOR PROSTATE CANCER: ICD-10-CM

## 2025-08-05 PROCEDURE — 1126F AMNT PAIN NOTED NONE PRSNT: CPT | Mod: 95 | Performed by: INTERNAL MEDICINE

## 2025-08-05 PROCEDURE — 98006 SYNCH AUDIO-VIDEO EST MOD 30: CPT | Performed by: INTERNAL MEDICINE

## 2025-08-05 RX ORDER — TESTOSTERONE CYPIONATE 1000 MG/10ML
100 INJECTION, SOLUTION INTRAMUSCULAR
Qty: 10 ML | Refills: 1 | Status: SHIPPED | OUTPATIENT
Start: 2025-08-05

## 2025-08-05 RX ORDER — LEVOTHYROXINE SODIUM 50 UG/1
50 TABLET ORAL
Qty: 90 TABLET | Refills: 1 | Status: SHIPPED | OUTPATIENT
Start: 2025-08-05

## 2025-08-05 RX ORDER — TESTOSTERONE CYPIONATE 1000 MG/10ML
50 INJECTION, SOLUTION INTRAMUSCULAR
Qty: 10 ML | Refills: 1 | Status: SHIPPED | OUTPATIENT
Start: 2025-08-05 | End: 2025-08-05

## 2025-08-05 ASSESSMENT — PAIN SCALES - GENERAL: PAINLEVEL_OUTOF10: NO PAIN (0)

## 2025-08-12 ENCOUNTER — TELEPHONE (OUTPATIENT)
Dept: FAMILY MEDICINE | Facility: CLINIC | Age: 45
End: 2025-08-12
Payer: COMMERCIAL

## 2025-08-13 ENCOUNTER — ALLIED HEALTH/NURSE VISIT (OUTPATIENT)
Dept: NURSING | Facility: CLINIC | Age: 45
End: 2025-08-13
Payer: COMMERCIAL

## 2025-08-13 DIAGNOSIS — Z53.9 DIAGNOSIS FOR ++++ WALK IN CLINIC ++++: Primary | ICD-10-CM

## 2025-08-13 DIAGNOSIS — R79.89 LOW TESTOSTERONE: Primary | ICD-10-CM

## 2025-08-13 RX ORDER — TESTOSTERONE CYPIONATE 200 MG/ML
100 INJECTION, SOLUTION INTRAMUSCULAR
Qty: 10 ML | Refills: 1 | Status: SHIPPED | OUTPATIENT
Start: 2025-08-13

## 2025-08-14 ENCOUNTER — MYC MEDICAL ADVICE (OUTPATIENT)
Dept: ENDOCRINOLOGY | Facility: CLINIC | Age: 45
End: 2025-08-14
Payer: COMMERCIAL

## 2025-08-14 DIAGNOSIS — R79.89 LOW TESTOSTERONE: ICD-10-CM

## 2025-08-19 RX ORDER — TESTOSTERONE CYPIONATE 200 MG/ML
100 INJECTION, SOLUTION INTRAMUSCULAR
Qty: 10 ML | Refills: 1 | Status: SHIPPED | OUTPATIENT
Start: 2025-08-19